# Patient Record
Sex: FEMALE | Race: WHITE | NOT HISPANIC OR LATINO
[De-identification: names, ages, dates, MRNs, and addresses within clinical notes are randomized per-mention and may not be internally consistent; named-entity substitution may affect disease eponyms.]

---

## 2018-01-21 ENCOUNTER — TRANSCRIPTION ENCOUNTER (OUTPATIENT)
Age: 28
End: 2018-01-21

## 2019-07-29 PROBLEM — Z00.00 ENCOUNTER FOR PREVENTIVE HEALTH EXAMINATION: Status: ACTIVE | Noted: 2019-07-29

## 2019-08-05 ENCOUNTER — APPOINTMENT (OUTPATIENT)
Dept: OBGYN | Facility: CLINIC | Age: 29
End: 2019-08-05
Payer: COMMERCIAL

## 2019-08-05 ENCOUNTER — RESULT CHARGE (OUTPATIENT)
Age: 29
End: 2019-08-05

## 2019-08-05 VITALS
BODY MASS INDEX: 34.38 KG/M2 | HEIGHT: 63 IN | DIASTOLIC BLOOD PRESSURE: 80 MMHG | SYSTOLIC BLOOD PRESSURE: 120 MMHG | WEIGHT: 194 LBS

## 2019-08-05 DIAGNOSIS — Z01.419 ENCOUNTER FOR GYNECOLOGICAL EXAMINATION (GENERAL) (ROUTINE) W/OUT ABNORMAL FINDINGS: ICD-10-CM

## 2019-08-05 LAB
BILIRUB UR QL STRIP: NORMAL
GLUCOSE UR-MCNC: NORMAL
HCG UR QL: 0.2 EU/DL
HCG UR QL: POSITIVE
HGB UR QL STRIP.AUTO: NORMAL
KETONES UR-MCNC: NORMAL
LEUKOCYTE ESTERASE UR QL STRIP: NORMAL
NITRITE UR QL STRIP: NORMAL
PH UR STRIP: 7
PROT UR STRIP-MCNC: NORMAL
QUALITY CONTROL: YES
SP GR UR STRIP: 1.01

## 2019-08-05 PROCEDURE — 81025 URINE PREGNANCY TEST: CPT

## 2019-08-05 PROCEDURE — 81003 URINALYSIS AUTO W/O SCOPE: CPT | Mod: QW

## 2019-08-05 PROCEDURE — 99385 PREV VISIT NEW AGE 18-39: CPT

## 2019-08-05 NOTE — DISCUSSION/SUMMARY
[FreeTextEntry1] : 1. pt will schedule new ob within the next 1-2 weeks along with gestational dating sonogram.\par 2. we discused her family history of BRCA. Pt has thought about testing but decided not to test now/wants to complete child bearing prior to testing.  we discussed screening guidelines in the event she was a carrier of a BRCA mutation- including beginning screening at age 25.  She was instructed to perform SBE throughout pregnancy and consider a baseline mammogram after pregnancy as we do not know her carrier status.\par 3. she did well with her first exam.  \par

## 2019-08-05 NOTE — CHIEF COMPLAINT
[Initial Visit] : initial GYN visit [FreeTextEntry1] : NEW PATIENT/ Confirmation of pregnancy  {cs }

## 2019-08-05 NOTE — PHYSICAL EXAM
[Awake] : awake [Alert] : alert [Mass] : no breast mass [Nipple Discharge] : no nipple discharge [Axillary LAD] : no axillary lymphadenopathy [Soft] : soft [Tender] : non tender [Distended] : not distended [Oriented x3] : oriented to person, place, and time [Normal] : cervix [Motion Tenderness] : there was no cervical motion tenderness [Tenderness] : nontender [Enlarged ___ wks] : enlarged [unfilled] ~Uweeks [Mass ___ cm] : no uterine mass was palpated [Uterine Adnexae] : were not tender and not enlarged

## 2019-08-05 NOTE — HISTORY OF PRESENT ILLNESS
[Regular Exercise] : regular exercise [Healthy Diet] : a healthy diet [Good] : being in good health [Reproductive Age] : is of reproductive age [No Previous Pap Smear] : no previous Papanicolaou cytology [HPV Vaccine NA Due to Age] : HPV vaccine not available to patient due to age [Definite:  ___ (Date)] : the last menstrual period was [unfilled] [Sexually Active] : is sexually active [Menarche Age: ____] : age at menarche was [unfilled] [Monogamous] : is monogamous [No] : no [Male ___] : [unfilled] male [Regular Cycle Intervals] : periods have been regular [Weight Concerns] : no concerns with her weight [Contraception] : does not use contraception

## 2019-08-08 ENCOUNTER — ASOB RESULT (OUTPATIENT)
Age: 29
End: 2019-08-08

## 2019-08-08 ENCOUNTER — APPOINTMENT (OUTPATIENT)
Dept: OBGYN | Facility: CLINIC | Age: 29
End: 2019-08-08
Payer: COMMERCIAL

## 2019-08-08 VITALS
DIASTOLIC BLOOD PRESSURE: 70 MMHG | WEIGHT: 193 LBS | BODY MASS INDEX: 34.2 KG/M2 | HEIGHT: 63 IN | SYSTOLIC BLOOD PRESSURE: 120 MMHG

## 2019-08-08 PROCEDURE — 76817 TRANSVAGINAL US OBSTETRIC: CPT

## 2019-08-09 LAB
ABO + RH PNL BLD: NORMAL
BASOPHILS # BLD AUTO: 0.06 K/UL
BASOPHILS NFR BLD AUTO: 0.5 %
BILIRUB UR QL STRIP: NORMAL
BLD GP AB SCN SERPL QL: NORMAL
C TRACH RRNA SPEC QL NAA+PROBE: NOT DETECTED
CYTOLOGY CVX/VAG DOC THIN PREP: ABNORMAL
EOSINOPHIL # BLD AUTO: 0.46 K/UL
EOSINOPHIL NFR BLD AUTO: 4 %
ESTIMATED AVERAGE GLUCOSE: 108 MG/DL
GLUCOSE UR-MCNC: NORMAL
HBA1C MFR BLD HPLC: 5.4 %
HBV SURFACE AG SER QL: NONREACTIVE
HCG UR QL: 0.2 EU/DL
HCT VFR BLD CALC: 40.6 %
HGB BLD-MCNC: 13.1 G/DL
HGB UR QL STRIP.AUTO: NORMAL
HIV1+2 AB SPEC QL IA.RAPID: NONREACTIVE
IMM GRANULOCYTES NFR BLD AUTO: 0.3 %
KETONES UR-MCNC: NORMAL
LEUKOCYTE ESTERASE UR QL STRIP: ABNORMAL
LYMPHOCYTES # BLD AUTO: 2.16 K/UL
LYMPHOCYTES NFR BLD AUTO: 18.6 %
MAN DIFF?: NORMAL
MCHC RBC-ENTMCNC: 28.8 PG
MCHC RBC-ENTMCNC: 32.3 GM/DL
MCV RBC AUTO: 89.2 FL
MONOCYTES # BLD AUTO: 0.62 K/UL
MONOCYTES NFR BLD AUTO: 5.3 %
N GONORRHOEA RRNA SPEC QL NAA+PROBE: NOT DETECTED
NEUTROPHILS # BLD AUTO: 8.26 K/UL
NEUTROPHILS NFR BLD AUTO: 71.3 %
NITRITE UR QL STRIP: NORMAL
PH UR STRIP: 6.5
PLATELET # BLD AUTO: 290 K/UL
PROT UR STRIP-MCNC: NORMAL
RBC # BLD: 4.55 M/UL
RBC # FLD: 13.3 %
SOURCE AMPLIFICATION: NORMAL
SOURCE TP AMPLIFICATION: NORMAL
SP GR UR STRIP: 1.01
T VAGINALIS RRNA SPEC QL NAA+PROBE: NOT DETECTED
TSH SERPL-ACNC: 4.06 UIU/ML
WBC # FLD AUTO: 11.59 K/UL

## 2019-08-26 ENCOUNTER — APPOINTMENT (OUTPATIENT)
Dept: OBGYN | Facility: CLINIC | Age: 29
End: 2019-08-26
Payer: COMMERCIAL

## 2019-08-26 ENCOUNTER — ASOB RESULT (OUTPATIENT)
Age: 29
End: 2019-08-26

## 2019-08-26 ENCOUNTER — NON-APPOINTMENT (OUTPATIENT)
Age: 29
End: 2019-08-26

## 2019-08-26 VITALS
DIASTOLIC BLOOD PRESSURE: 70 MMHG | SYSTOLIC BLOOD PRESSURE: 126 MMHG | WEIGHT: 195 LBS | BODY MASS INDEX: 34.55 KG/M2 | HEIGHT: 63 IN

## 2019-08-26 LAB
AR GENE MUT ANL BLD/T: NEGATIVE
B19V IGG SER QL IA: 6.9 INDEX
B19V IGG+IGM SER-IMP: NORMAL
B19V IGG+IGM SER-IMP: POSITIVE
B19V IGM FLD-ACNC: 0.2
B19V IGM SER-ACNC: NEGATIVE
BILIRUB UR QL STRIP: NORMAL
CFTR MUT TESTED BLD/T: NEGATIVE
CLARITY UR: CLEAR
CMV IGG SERPL QL: <0.2 U/ML
CMV IGG SERPL-IMP: NEGATIVE
CMV IGM SERPL QL: <8 AU/ML
CMV IGM SERPL QL: NEGATIVE
COLLECTION METHOD: NORMAL
FMR1 GENE MUT ANL BLD/T: NORMAL
GLUCOSE UR-MCNC: NORMAL
HCG UR QL: 0.2 EU/DL
HGB A MFR BLD: 97.3 %
HGB A2 MFR BLD: 2.7 %
HGB FRACT BLD-IMP: NORMAL
HGB UR QL STRIP.AUTO: NORMAL
KETONES UR-MCNC: NORMAL
LEUKOCYTE ESTERASE UR QL STRIP: NORMAL
MEV IGG FLD QL IA: 115 AU/ML
MEV IGG+IGM SER-IMP: POSITIVE
MEV IGM SER QL: NEGATIVE
NITRITE UR QL STRIP: NORMAL
PH UR STRIP: 6.5
PROT UR STRIP-MCNC: NORMAL
RUBV IGG FLD-ACNC: 1.5 INDEX
RUBV IGG SER-IMP: POSITIVE
SP GR UR STRIP: 1
T GONDII AB SER-IMP: NEGATIVE
T GONDII AB SER-IMP: NEGATIVE
T GONDII IGG SER QL: <3 IU/ML
T GONDII IGM SER QL: <3 AU/ML
T PALLIDUM AB SER QL IA: NEGATIVE
VZV AB TITR SER: POSITIVE
VZV IGG SER IF-ACNC: 1725 INDEX

## 2019-08-26 PROCEDURE — 76813 OB US NUCHAL MEAS 1 GEST: CPT

## 2019-08-26 PROCEDURE — 0502F SUBSEQUENT PRENATAL CARE: CPT

## 2019-08-26 PROCEDURE — 36415 COLL VENOUS BLD VENIPUNCTURE: CPT

## 2019-09-03 LAB
1ST TRIMESTER DATA: NORMAL
ADDENDUM DOC: NORMAL
AFP PNL SERPL: NORMAL
AFP SERPL-ACNC: NORMAL
CLINICAL BIOCHEMIST REVIEW: NORMAL
FREE BETA HCG 1ST TRIMESTER: NORMAL
Lab: NORMAL
NASAL BONE: PRESENT
NOTES NTD: NORMAL
NT: NORMAL
PAPP-A SERPL-ACNC: NORMAL
TRISOMY 18/3: NORMAL

## 2019-09-05 ENCOUNTER — TRANSCRIPTION ENCOUNTER (OUTPATIENT)
Age: 29
End: 2019-09-05

## 2019-09-24 ENCOUNTER — NON-APPOINTMENT (OUTPATIENT)
Age: 29
End: 2019-09-24

## 2019-09-24 ENCOUNTER — APPOINTMENT (OUTPATIENT)
Dept: OBGYN | Facility: CLINIC | Age: 29
End: 2019-09-24
Payer: COMMERCIAL

## 2019-09-24 VITALS
DIASTOLIC BLOOD PRESSURE: 58 MMHG | SYSTOLIC BLOOD PRESSURE: 118 MMHG | HEIGHT: 63 IN | WEIGHT: 199 LBS | BODY MASS INDEX: 35.26 KG/M2

## 2019-09-24 LAB
BILIRUB UR QL STRIP: NORMAL
GLUCOSE UR-MCNC: NORMAL
HCG UR QL: 0.2 EU/DL
HGB UR QL STRIP.AUTO: NORMAL
KETONES UR-MCNC: ABNORMAL
LEUKOCYTE ESTERASE UR QL STRIP: NORMAL
NITRITE UR QL STRIP: NORMAL
PH UR STRIP: 7
PROT UR STRIP-MCNC: NORMAL
SP GR UR STRIP: 1.02

## 2019-09-24 PROCEDURE — 0502F SUBSEQUENT PRENATAL CARE: CPT

## 2019-09-24 PROCEDURE — 36415 COLL VENOUS BLD VENIPUNCTURE: CPT

## 2019-10-01 ENCOUNTER — NON-APPOINTMENT (OUTPATIENT)
Age: 29
End: 2019-10-01

## 2019-10-16 ENCOUNTER — APPOINTMENT (OUTPATIENT)
Dept: ANTEPARTUM | Facility: CLINIC | Age: 29
End: 2019-10-16
Payer: COMMERCIAL

## 2019-10-16 ENCOUNTER — ASOB RESULT (OUTPATIENT)
Age: 29
End: 2019-10-16

## 2019-10-16 PROCEDURE — 76811 OB US DETAILED SNGL FETUS: CPT

## 2019-10-23 ENCOUNTER — APPOINTMENT (OUTPATIENT)
Dept: OBGYN | Facility: CLINIC | Age: 29
End: 2019-10-23
Payer: COMMERCIAL

## 2019-10-23 ENCOUNTER — NON-APPOINTMENT (OUTPATIENT)
Age: 29
End: 2019-10-23

## 2019-10-23 VITALS
BODY MASS INDEX: 37.03 KG/M2 | WEIGHT: 209 LBS | SYSTOLIC BLOOD PRESSURE: 120 MMHG | HEIGHT: 63 IN | DIASTOLIC BLOOD PRESSURE: 76 MMHG

## 2019-10-23 LAB
BILIRUB UR QL STRIP: NORMAL
GLUCOSE UR-MCNC: NORMAL
HCG UR QL: 0.2 EU/DL
HGB UR QL STRIP.AUTO: NORMAL
KETONES UR-MCNC: NORMAL
LEUKOCYTE ESTERASE UR QL STRIP: NORMAL
NITRITE UR QL STRIP: NORMAL
PH UR STRIP: 7
PROT UR STRIP-MCNC: NORMAL
SP GR UR STRIP: 1.01

## 2019-10-23 PROCEDURE — 0502F SUBSEQUENT PRENATAL CARE: CPT

## 2019-10-26 LAB
1ST TRIMESTER DATA: NORMAL
2ND TRIMESTER DATA: NORMAL
ADDENDUM DOC: NORMAL
AFP PNL SERPL: NORMAL
AFP SERPL-ACNC: NORMAL
AFP SERPL-ACNC: NORMAL
B-HCG FREE SERPL-MCNC: NORMAL
CLINICAL BIOCHEMIST REVIEW: NORMAL
FREE BETA HCG 1ST TRIMESTER: NORMAL
INHIBIN A SERPL-MCNC: NORMAL
NASAL BONE: PRESENT
NOTES NTD: NORMAL
NT: NORMAL
PAPP-A SERPL-ACNC: NORMAL
U ESTRIOL SERPL-SCNC: NORMAL

## 2019-10-27 ENCOUNTER — NON-APPOINTMENT (OUTPATIENT)
Age: 29
End: 2019-10-27

## 2019-11-21 ENCOUNTER — APPOINTMENT (OUTPATIENT)
Dept: OBGYN | Facility: CLINIC | Age: 29
End: 2019-11-21
Payer: COMMERCIAL

## 2019-11-21 ENCOUNTER — NON-APPOINTMENT (OUTPATIENT)
Age: 29
End: 2019-11-21

## 2019-11-21 VITALS
DIASTOLIC BLOOD PRESSURE: 64 MMHG | HEIGHT: 63 IN | BODY MASS INDEX: 38.45 KG/M2 | SYSTOLIC BLOOD PRESSURE: 110 MMHG | WEIGHT: 217 LBS

## 2019-11-21 DIAGNOSIS — Z3A.09 9 WEEKS GESTATION OF PREGNANCY: ICD-10-CM

## 2019-11-21 DIAGNOSIS — Z34.91 ENCOUNTER FOR SUPERVISION OF NORMAL PREGNANCY, UNSPECIFIED, FIRST TRIMESTER: ICD-10-CM

## 2019-11-21 DIAGNOSIS — Z32.01 ENCOUNTER FOR PREGNANCY TEST, RESULT POSITIVE: ICD-10-CM

## 2019-11-21 LAB
BILIRUB UR QL STRIP: NORMAL
GLUCOSE UR-MCNC: NORMAL
HCG UR QL: 0.2 EU/DL
HGB UR QL STRIP.AUTO: NORMAL
KETONES UR-MCNC: NORMAL
LEUKOCYTE ESTERASE UR QL STRIP: NORMAL
NITRITE UR QL STRIP: NORMAL
PH UR STRIP: 6
PROT UR STRIP-MCNC: NORMAL
SP GR UR STRIP: 1.02

## 2019-11-21 PROCEDURE — 90471 IMMUNIZATION ADMIN: CPT

## 2019-11-21 PROCEDURE — 90686 IIV4 VACC NO PRSV 0.5 ML IM: CPT

## 2019-11-21 PROCEDURE — 0502F SUBSEQUENT PRENATAL CARE: CPT

## 2019-12-13 ENCOUNTER — APPOINTMENT (OUTPATIENT)
Dept: OBGYN | Facility: CLINIC | Age: 29
End: 2019-12-13
Payer: COMMERCIAL

## 2019-12-13 VITALS
HEIGHT: 63 IN | DIASTOLIC BLOOD PRESSURE: 58 MMHG | BODY MASS INDEX: 38.98 KG/M2 | WEIGHT: 220 LBS | SYSTOLIC BLOOD PRESSURE: 112 MMHG

## 2019-12-13 LAB
BILIRUB UR QL STRIP: NORMAL
GLUCOSE UR-MCNC: NORMAL
HCG UR QL: 0.2 EU/DL
HGB UR QL STRIP.AUTO: NORMAL
KETONES UR-MCNC: NORMAL
LEUKOCYTE ESTERASE UR QL STRIP: ABNORMAL
NITRITE UR QL STRIP: NORMAL
PH UR STRIP: 7
PROT UR STRIP-MCNC: NORMAL
SP GR UR STRIP: 1.01

## 2019-12-13 PROCEDURE — 0502F SUBSEQUENT PRENATAL CARE: CPT

## 2019-12-27 ENCOUNTER — APPOINTMENT (OUTPATIENT)
Dept: OBGYN | Facility: CLINIC | Age: 29
End: 2019-12-27
Payer: COMMERCIAL

## 2019-12-27 ENCOUNTER — NON-APPOINTMENT (OUTPATIENT)
Age: 29
End: 2019-12-27

## 2019-12-27 VITALS
WEIGHT: 227 LBS | DIASTOLIC BLOOD PRESSURE: 70 MMHG | BODY MASS INDEX: 40.22 KG/M2 | SYSTOLIC BLOOD PRESSURE: 110 MMHG | HEIGHT: 63 IN

## 2019-12-27 LAB
BILIRUB UR QL STRIP: NORMAL
GLUCOSE UR-MCNC: NORMAL
HCG UR QL: 0.2 EU/DL
HGB UR QL STRIP.AUTO: NORMAL
KETONES UR-MCNC: NORMAL
LEUKOCYTE ESTERASE UR QL STRIP: ABNORMAL
NITRITE UR QL STRIP: NORMAL
PH UR STRIP: 7
PROT UR STRIP-MCNC: NORMAL
SP GR UR STRIP: 1.02

## 2019-12-27 PROCEDURE — 0502F SUBSEQUENT PRENATAL CARE: CPT

## 2019-12-27 PROCEDURE — 59425 ANTEPARTUM CARE ONLY: CPT

## 2019-12-28 LAB
BASOPHILS # BLD AUTO: 0.06 K/UL
BASOPHILS NFR BLD AUTO: 0.5 %
EOSINOPHIL # BLD AUTO: 0.49 K/UL
EOSINOPHIL NFR BLD AUTO: 4.2 %
GLUCOSE 1H P 50 G GLC PO SERPL-MCNC: 129 MG/DL
HCT VFR BLD CALC: 37.2 %
HGB BLD-MCNC: 11.8 G/DL
IMM GRANULOCYTES NFR BLD AUTO: 0.5 %
LYMPHOCYTES # BLD AUTO: 1.69 K/UL
LYMPHOCYTES NFR BLD AUTO: 14.6 %
MAN DIFF?: NORMAL
MCHC RBC-ENTMCNC: 29.5 PG
MCHC RBC-ENTMCNC: 31.7 GM/DL
MCV RBC AUTO: 93 FL
MONOCYTES # BLD AUTO: 0.58 K/UL
MONOCYTES NFR BLD AUTO: 5 %
NEUTROPHILS # BLD AUTO: 8.68 K/UL
NEUTROPHILS NFR BLD AUTO: 75.2 %
PLATELET # BLD AUTO: 263 K/UL
RBC # BLD: 4 M/UL
RBC # FLD: 13.6 %
WBC # FLD AUTO: 11.56 K/UL

## 2020-01-02 ENCOUNTER — APPOINTMENT (OUTPATIENT)
Dept: ANTEPARTUM | Facility: CLINIC | Age: 30
End: 2020-01-02
Payer: MEDICARE

## 2020-01-02 ENCOUNTER — ASOB RESULT (OUTPATIENT)
Age: 30
End: 2020-01-02

## 2020-01-02 PROCEDURE — 76816 OB US FOLLOW-UP PER FETUS: CPT

## 2020-01-02 PROCEDURE — 76820 UMBILICAL ARTERY ECHO: CPT

## 2020-01-02 PROCEDURE — 76821 MIDDLE CEREBRAL ARTERY ECHO: CPT

## 2020-01-10 ENCOUNTER — APPOINTMENT (OUTPATIENT)
Dept: ANTEPARTUM | Facility: CLINIC | Age: 30
End: 2020-01-10
Payer: MEDICARE

## 2020-01-10 ENCOUNTER — APPOINTMENT (OUTPATIENT)
Dept: OBGYN | Facility: CLINIC | Age: 30
End: 2020-01-10
Payer: COMMERCIAL

## 2020-01-10 ENCOUNTER — ASOB RESULT (OUTPATIENT)
Age: 30
End: 2020-01-10

## 2020-01-10 VITALS
SYSTOLIC BLOOD PRESSURE: 122 MMHG | DIASTOLIC BLOOD PRESSURE: 74 MMHG | HEIGHT: 63 IN | WEIGHT: 228 LBS | BODY MASS INDEX: 40.4 KG/M2

## 2020-01-10 PROCEDURE — 76820 UMBILICAL ARTERY ECHO: CPT

## 2020-01-10 PROCEDURE — 90471 IMMUNIZATION ADMIN: CPT

## 2020-01-10 PROCEDURE — 76818 FETAL BIOPHYS PROFILE W/NST: CPT

## 2020-01-10 PROCEDURE — 90715 TDAP VACCINE 7 YRS/> IM: CPT

## 2020-01-10 PROCEDURE — 0502F SUBSEQUENT PRENATAL CARE: CPT

## 2020-01-14 ENCOUNTER — NON-APPOINTMENT (OUTPATIENT)
Age: 30
End: 2020-01-14

## 2020-01-14 ENCOUNTER — APPOINTMENT (OUTPATIENT)
Dept: CARDIOLOGY | Facility: CLINIC | Age: 30
End: 2020-01-14
Payer: COMMERCIAL

## 2020-01-14 VITALS
WEIGHT: 230 LBS | HEART RATE: 86 BPM | BODY MASS INDEX: 40.75 KG/M2 | HEIGHT: 63 IN | DIASTOLIC BLOOD PRESSURE: 77 MMHG | RESPIRATION RATE: 15 BRPM | SYSTOLIC BLOOD PRESSURE: 123 MMHG

## 2020-01-14 DIAGNOSIS — Z72.3 LACK OF PHYSICAL EXERCISE: ICD-10-CM

## 2020-01-14 PROCEDURE — 99243 OFF/OP CNSLTJ NEW/EST LOW 30: CPT

## 2020-01-14 PROCEDURE — 93000 ELECTROCARDIOGRAM COMPLETE: CPT

## 2020-01-14 RX ORDER — ALBUTEROL SULFATE 2.5 MG/3ML
(2.5 MG/3ML) SOLUTION RESPIRATORY (INHALATION)
Refills: 0 | Status: DISCONTINUED | COMMUNITY
End: 2020-01-14

## 2020-01-14 NOTE — DISCUSSION/SUMMARY
[FreeTextEntry1] : Patient is a 30yo F who is 32 weeks gestation here for cardiac evaluation of palpitations. Symptoms likely physiologic related to standing, pregnancy and reduced venous return leading to increased  HR/sympathetic tone. Low suspicion for dysrhythmia. HAs improved with cutting back caffeine. CArdiac exam and ECG unremarkable.\par \par 1. PAtient to continue to monitor symptoms, if progress/worsen will let me know \par 2. INcrease PO hydration, avoid prolonged standing if possible and keep off caffeine\par 3. Follow up as needed

## 2020-01-14 NOTE — PHYSICAL EXAM
[General Appearance - Well Developed] : well developed [General Appearance - Well Nourished] : well nourished [Normal Conjunctiva] : the conjunctiva exhibited no abnormalities [Normal Jugular Venous V Waves Present] : normal jugular venous V waves present [Normal Oropharynx] : normal oropharynx [Heart Rate And Rhythm] : heart rate and rhythm were normal [Heart Sounds] : normal S1 and S2 [Murmurs] : no murmurs present [] : no respiratory distress [Auscultation Breath Sounds / Voice Sounds] : lungs were clear to auscultation bilaterally [Respiration, Rhythm And Depth] : normal respiratory rhythm and effort [Bowel Sounds] : normal bowel sounds [Abdomen Soft] : soft [Abdomen Tenderness] : non-tender [Abnormal Walk] : normal gait [Nail Clubbing] : no clubbing of the fingernails [Cyanosis, Localized] : no localized cyanosis [Skin Turgor] : normal skin turgor [Skin Color & Pigmentation] : normal skin color and pigmentation [Oriented To Time, Place, And Person] : oriented to person, place, and time [FreeTextEntry1] : no edema

## 2020-01-14 NOTE — HISTORY OF PRESENT ILLNESS
[FreeTextEntry1] : Patient is a 28yo F who is 32 weeks gestation here for cardiac evaluation of palpitations. Works as LPN and on feet a lot. Works in [place] at [employer]. States asthma has gotten worse as gotten older, also during pregnancy. Had flu couple weeks back and now better. Back to baseline now. \par \par Palps started at work, was on her feet a few weeks back. Felt her heart pounding, felt dizzy and weak in her arms. Was in the morning around 8am. Resolved on its own. REcurred couple times throughout the morning. Went to ED at [place], ECG unremarkable and BW as well. Palps lasted a bout 1-2 minutes. Has recurred a couple times. Stopped caffeine, which seems to have helped some. When up on feet symptoms worse. No syncope.No CP/SOB/edema. FElt nausea as well with palps at times. HAs not occurred daily but at times since initial episode.  \par \par ROS: GI negative, all others negative

## 2020-01-17 ENCOUNTER — APPOINTMENT (OUTPATIENT)
Dept: ANTEPARTUM | Facility: CLINIC | Age: 30
End: 2020-01-17
Payer: MEDICARE

## 2020-01-17 ENCOUNTER — ASOB RESULT (OUTPATIENT)
Age: 30
End: 2020-01-17

## 2020-01-17 PROCEDURE — 76818 FETAL BIOPHYS PROFILE W/NST: CPT

## 2020-01-17 PROCEDURE — 76820 UMBILICAL ARTERY ECHO: CPT

## 2020-01-24 ENCOUNTER — ASOB RESULT (OUTPATIENT)
Age: 30
End: 2020-01-24

## 2020-01-24 ENCOUNTER — APPOINTMENT (OUTPATIENT)
Dept: ANTEPARTUM | Facility: CLINIC | Age: 30
End: 2020-01-24
Payer: MEDICARE

## 2020-01-24 ENCOUNTER — APPOINTMENT (OUTPATIENT)
Dept: OBGYN | Facility: CLINIC | Age: 30
End: 2020-01-24
Payer: COMMERCIAL

## 2020-01-24 ENCOUNTER — NON-APPOINTMENT (OUTPATIENT)
Age: 30
End: 2020-01-24

## 2020-01-24 VITALS
SYSTOLIC BLOOD PRESSURE: 120 MMHG | BODY MASS INDEX: 40.93 KG/M2 | DIASTOLIC BLOOD PRESSURE: 72 MMHG | WEIGHT: 231 LBS | HEIGHT: 63 IN

## 2020-01-24 LAB
BILIRUB UR QL STRIP: NORMAL
CLARITY UR: CLEAR
COLLECTION METHOD: NORMAL
GLUCOSE UR-MCNC: NORMAL
HCG UR QL: 0.2 EU/DL
HGB UR QL STRIP.AUTO: NORMAL
KETONES UR-MCNC: NORMAL
LEUKOCYTE ESTERASE UR QL STRIP: NORMAL
NITRITE UR QL STRIP: NORMAL
PH UR STRIP: 7.5
PROT UR STRIP-MCNC: NORMAL
SP GR UR STRIP: 1.02

## 2020-01-24 PROCEDURE — 93976 VASCULAR STUDY: CPT

## 2020-01-24 PROCEDURE — 0502F SUBSEQUENT PRENATAL CARE: CPT

## 2020-01-24 PROCEDURE — 76820 UMBILICAL ARTERY ECHO: CPT

## 2020-01-24 PROCEDURE — 76821 MIDDLE CEREBRAL ARTERY ECHO: CPT

## 2020-01-24 PROCEDURE — 76818 FETAL BIOPHYS PROFILE W/NST: CPT

## 2020-01-31 ENCOUNTER — APPOINTMENT (OUTPATIENT)
Dept: ANTEPARTUM | Facility: CLINIC | Age: 30
End: 2020-01-31
Payer: MEDICARE

## 2020-01-31 ENCOUNTER — ASOB RESULT (OUTPATIENT)
Age: 30
End: 2020-01-31

## 2020-01-31 PROCEDURE — 76821 MIDDLE CEREBRAL ARTERY ECHO: CPT

## 2020-01-31 PROCEDURE — 93976 VASCULAR STUDY: CPT

## 2020-01-31 PROCEDURE — 76818 FETAL BIOPHYS PROFILE W/NST: CPT

## 2020-01-31 PROCEDURE — 76816 OB US FOLLOW-UP PER FETUS: CPT

## 2020-01-31 PROCEDURE — 76820 UMBILICAL ARTERY ECHO: CPT

## 2020-02-07 ENCOUNTER — ASOB RESULT (OUTPATIENT)
Age: 30
End: 2020-02-07

## 2020-02-07 ENCOUNTER — APPOINTMENT (OUTPATIENT)
Dept: OBGYN | Facility: CLINIC | Age: 30
End: 2020-02-07
Payer: COMMERCIAL

## 2020-02-07 ENCOUNTER — APPOINTMENT (OUTPATIENT)
Dept: ANTEPARTUM | Facility: CLINIC | Age: 30
End: 2020-02-07
Payer: COMMERCIAL

## 2020-02-07 VITALS
WEIGHT: 247 LBS | HEIGHT: 63 IN | SYSTOLIC BLOOD PRESSURE: 118 MMHG | DIASTOLIC BLOOD PRESSURE: 64 MMHG | BODY MASS INDEX: 43.77 KG/M2

## 2020-02-07 PROCEDURE — 76818 FETAL BIOPHYS PROFILE W/NST: CPT

## 2020-02-07 PROCEDURE — 76820 UMBILICAL ARTERY ECHO: CPT

## 2020-02-07 PROCEDURE — 76821 MIDDLE CEREBRAL ARTERY ECHO: CPT

## 2020-02-07 PROCEDURE — 36415 COLL VENOUS BLD VENIPUNCTURE: CPT

## 2020-02-07 PROCEDURE — 93976 VASCULAR STUDY: CPT

## 2020-02-07 PROCEDURE — 0502F SUBSEQUENT PRENATAL CARE: CPT

## 2020-02-08 ENCOUNTER — NON-APPOINTMENT (OUTPATIENT)
Age: 30
End: 2020-02-08

## 2020-02-09 LAB
BILIRUB UR QL STRIP: NORMAL
GLUCOSE UR-MCNC: NORMAL
HCG UR QL: 0.2 EU/DL
HGB UR QL STRIP.AUTO: NORMAL
HIV1+2 AB SPEC QL IA.RAPID: NONREACTIVE
KETONES UR-MCNC: NORMAL
LEUKOCYTE ESTERASE UR QL STRIP: NORMAL
NITRITE UR QL STRIP: NORMAL
PH UR STRIP: 5.5
PROT UR STRIP-MCNC: NORMAL
SP GR UR STRIP: 1

## 2020-02-14 ENCOUNTER — APPOINTMENT (OUTPATIENT)
Dept: ANTEPARTUM | Facility: CLINIC | Age: 30
End: 2020-02-14
Payer: COMMERCIAL

## 2020-02-14 ENCOUNTER — ASOB RESULT (OUTPATIENT)
Age: 30
End: 2020-02-14

## 2020-02-14 ENCOUNTER — APPOINTMENT (OUTPATIENT)
Dept: OBGYN | Facility: CLINIC | Age: 30
End: 2020-02-14
Payer: COMMERCIAL

## 2020-02-14 VITALS
WEIGHT: 237 LBS | HEIGHT: 63 IN | DIASTOLIC BLOOD PRESSURE: 64 MMHG | BODY MASS INDEX: 41.99 KG/M2 | SYSTOLIC BLOOD PRESSURE: 120 MMHG

## 2020-02-14 PROCEDURE — 93976 VASCULAR STUDY: CPT

## 2020-02-14 PROCEDURE — 0502F SUBSEQUENT PRENATAL CARE: CPT

## 2020-02-14 PROCEDURE — 76818 FETAL BIOPHYS PROFILE W/NST: CPT

## 2020-02-14 PROCEDURE — 76820 UMBILICAL ARTERY ECHO: CPT

## 2020-02-14 PROCEDURE — 76821 MIDDLE CEREBRAL ARTERY ECHO: CPT

## 2020-02-21 ENCOUNTER — APPOINTMENT (OUTPATIENT)
Dept: ANTEPARTUM | Facility: CLINIC | Age: 30
End: 2020-02-21
Payer: COMMERCIAL

## 2020-02-21 ENCOUNTER — NON-APPOINTMENT (OUTPATIENT)
Age: 30
End: 2020-02-21

## 2020-02-21 ENCOUNTER — APPOINTMENT (OUTPATIENT)
Dept: OBGYN | Facility: CLINIC | Age: 30
End: 2020-02-21
Payer: COMMERCIAL

## 2020-02-21 ENCOUNTER — ASOB RESULT (OUTPATIENT)
Age: 30
End: 2020-02-21

## 2020-02-21 VITALS
BODY MASS INDEX: 43.41 KG/M2 | WEIGHT: 245 LBS | SYSTOLIC BLOOD PRESSURE: 112 MMHG | DIASTOLIC BLOOD PRESSURE: 68 MMHG | HEIGHT: 63 IN

## 2020-02-21 LAB
B-HEM STREP SPEC QL CULT: ABNORMAL
BILIRUB UR QL STRIP: NORMAL
GLUCOSE UR-MCNC: NORMAL
HCG UR QL: 0.2 EU/DL
HGB UR QL STRIP.AUTO: NORMAL
KETONES UR-MCNC: NORMAL
LEUKOCYTE ESTERASE UR QL STRIP: ABNORMAL
NITRITE UR QL STRIP: NORMAL
PH UR STRIP: 7
PROT UR STRIP-MCNC: NORMAL
SP GR UR STRIP: 1.01

## 2020-02-21 PROCEDURE — 76820 UMBILICAL ARTERY ECHO: CPT

## 2020-02-21 PROCEDURE — 76821 MIDDLE CEREBRAL ARTERY ECHO: CPT

## 2020-02-21 PROCEDURE — 93976 VASCULAR STUDY: CPT

## 2020-02-21 PROCEDURE — 76818 FETAL BIOPHYS PROFILE W/NST: CPT

## 2020-02-21 PROCEDURE — 59425 ANTEPARTUM CARE ONLY: CPT

## 2020-02-21 PROCEDURE — 0502F SUBSEQUENT PRENATAL CARE: CPT

## 2020-02-21 PROCEDURE — 76816 OB US FOLLOW-UP PER FETUS: CPT

## 2020-02-23 RX ORDER — SODIUM CHLORIDE 9 MG/ML
1000 INJECTION, SOLUTION INTRAVENOUS
Refills: 0 | Status: DISCONTINUED | OUTPATIENT
Start: 2020-02-24 | End: 2020-02-26

## 2020-02-23 RX ORDER — AMPICILLIN TRIHYDRATE 250 MG
1 CAPSULE ORAL EVERY 4 HOURS
Refills: 0 | Status: DISCONTINUED | OUTPATIENT
Start: 2020-02-24 | End: 2020-02-26

## 2020-02-23 RX ORDER — OXYTOCIN 10 UNIT/ML
333.33 VIAL (ML) INJECTION
Qty: 20 | Refills: 0 | Status: DISCONTINUED | OUTPATIENT
Start: 2020-02-24 | End: 2020-02-26

## 2020-02-23 RX ORDER — CITRIC ACID/SODIUM CITRATE 300-500 MG
30 SOLUTION, ORAL ORAL ONCE
Refills: 0 | Status: COMPLETED | OUTPATIENT
Start: 2020-02-24 | End: 2020-02-26

## 2020-02-23 NOTE — OB PROVIDER H&P - HISTORY OF PRESENT ILLNESS
Patient is a 30yo  at 38 3/7 weeks gestation c/w LMP who presents to L&D for IOL for polyhydramnios SABRINA: 25.84  RD: 3/6/20  LMP: 19  Prenatal course complicated by:  1.	Polyhydramnios, SABRINA 25.84  2.	Excessive fetal growth  3.	Obesity, BMI 34.18  OBHx: denies  PMH: asthma  PSH: denies  Meds: PNV, albuterol  ALL: iodine, shellfish  Sono: vertex, placenta anterior              EFW: 4000                BMI:  34.18    GBS: POS  HIV: NR  RPR: Neg  Rubella: Immune  Rubeola: Immune  HepB: NR  ABO: O+ Patient is a 30yo  at 38 3/7 weeks gestation c/w LMP who presents to L&D for IOL for polyhydramnios SABRINA: 25.84  RD: 3/6/20  LMP: 19  Prenatal course complicated by:  1.	Polyhydramnios, SABRINA 25.84  2.	Excessive fetal growth  3.	Obesity, BMI 34.18  OBHx: denies  PMH: asthma  PSH: denies  Meds: PNV, albuterol, asthmanex  ALL: iodine, shellfish  Sono: vertex, placenta anterior              EFW: 4000                BMI:  34.18    GBS: POS  HIV: NR  RPR: Neg  Rubella: Immune  Rubeola: Immune  HepB: NR  ABO: O+

## 2020-02-23 NOTE — OB PROVIDER H&P - NSHPPHYSICALEXAM_GEN_ALL_CORE
What Are Neuromas of the Foot?  The ball of your foot is the bottom part just behind your toes. Bands of tissue (ligaments) connect the bones in the ball of your foot. Nerves run between the bones and underneath the ligaments. When a nerve becomes pinched, this causes it to swell and become painful due to the thickening of the tissue that surrounds the nerve. The painful, swollen nerve is called a neuroma (also called Jose's neuroma).     A neuroma most often occurs at the base of either the third and fourth toes or the second and third toes.   What causes a neuroma?  Wearing tight or high-heeled shoes can cause a neuroma. Shoes that are too narrow or too pointed squeeze the bones in the ball of the foot. Shoes with high heels put extra pressure on the ends of the bones. When the bones are squeezed together, they pinch the nerve that runs between them.  Symptoms  The most common symptom of a neuroma is pain in the ball of the foot between two toes. The pain may be dull or sharp. It may feel as if you have a stone in your shoe. You may also have tingling or numbness in one or both of the toes. Symptoms may occur after you have been walking or standing for a while. Taking off your shoes and rubbing the ball of your foot may decrease or relieve the pain.  Preventing future problems  To prevent a future neuroma, buy shoes with plenty of room across the ball of the foot and in the toes. This keeps the bones from being squeezed together. Wearing low-heeled shoes (less than 2 inches) also puts less pressure on the bones and nerves in the ball of the foot.   © 4484-3291 [employer] [address] [place] [address], [place] [address]. All rights reserved. This information is not intended as a substitute for professional medical care. Always follow your healthcare professional's instructions.         Vital Signs Last 24 Hrs  T(C): 36.7 (24 Feb 2020 21:22), Max: 36.7 (24 Feb 2020 21:06)  T(F): 98.06 (24 Feb 2020 21:22), Max: 98.1 (24 Feb 2020 21:06)  HR: 109 (24 Feb 2020 21:22) (109 - 109)  BP: 143/89 (24 Feb 2020 21:22) (143/89 - 143/89)  RR: 16 (24 Feb 2020 21:22) (16 - 16)    General: NAD  Abdomen: soft, NT, gravid uterus  SVE: 1/20/-3, cephalic, intact    Bedside sono: cephalic, anterior placenta

## 2020-02-23 NOTE — OB PROVIDER H&P - ASSESSMENT
30yo  at 38 3/7 weeks gestation c/w LMP who presents to L&D for IOL for polyhydramnios    -Admit to L&D  -Admission labs sent  -GBS positive, ampicillin for ppx  -Begin induction 30yo  at 38 3/7 weeks gestation c/w LMP who presents to L&D for IOL for polyhydramnios    -Admit to L&D  -Admission labs sent  -GBS positive, ampicillin for ppx  -Cytotec induction   - IV Fluids

## 2020-02-24 ENCOUNTER — INPATIENT (INPATIENT)
Facility: HOSPITAL | Age: 30
LOS: 5 days | Discharge: HOME | End: 2020-03-01
Attending: SPECIALIST | Admitting: SPECIALIST
Payer: COMMERCIAL

## 2020-02-24 VITALS
TEMPERATURE: 98 F | DIASTOLIC BLOOD PRESSURE: 89 MMHG | SYSTOLIC BLOOD PRESSURE: 143 MMHG | RESPIRATION RATE: 16 BRPM | WEIGHT: 293 LBS | HEIGHT: 63 IN | HEART RATE: 109 BPM

## 2020-02-24 DIAGNOSIS — O47.1 FALSE LABOR AT OR AFTER 37 COMPLETED WEEKS OF GESTATION: ICD-10-CM

## 2020-02-24 DIAGNOSIS — Z3A.38 38 WEEKS GESTATION OF PREGNANCY: ICD-10-CM

## 2020-02-24 DIAGNOSIS — O40.3XX0 POLYHYDRAMNIOS, THIRD TRIMESTER, NOT APPLICABLE OR UNSPECIFIED: ICD-10-CM

## 2020-02-24 LAB
APPEARANCE UR: CLEAR — SIGNIFICANT CHANGE UP
BASOPHILS # BLD AUTO: 0.02 K/UL — SIGNIFICANT CHANGE UP (ref 0–0.2)
BASOPHILS NFR BLD AUTO: 0.2 % — SIGNIFICANT CHANGE UP (ref 0–2)
BILIRUB UR-MCNC: NEGATIVE — SIGNIFICANT CHANGE UP
COLOR SPEC: YELLOW — SIGNIFICANT CHANGE UP
DIFF PNL FLD: NEGATIVE — SIGNIFICANT CHANGE UP
EOSINOPHIL # BLD AUTO: 0.09 K/UL — SIGNIFICANT CHANGE UP (ref 0–0.5)
EOSINOPHIL NFR BLD AUTO: 0.8 % — SIGNIFICANT CHANGE UP (ref 0–6)
EPI CELLS # UR: SIGNIFICANT CHANGE UP
GLUCOSE UR QL: NEGATIVE MG/DL — SIGNIFICANT CHANGE UP
HCT VFR BLD CALC: 35 % — SIGNIFICANT CHANGE UP (ref 34.5–45)
HGB BLD-MCNC: 11.2 G/DL — LOW (ref 11.5–15.5)
IMM GRANULOCYTES NFR BLD AUTO: 0.3 % — SIGNIFICANT CHANGE UP (ref 0–1.5)
KETONES UR-MCNC: NEGATIVE — SIGNIFICANT CHANGE UP
LEUKOCYTE ESTERASE UR-ACNC: ABNORMAL
LYMPHOCYTES # BLD AUTO: 1.97 K/UL — SIGNIFICANT CHANGE UP (ref 1–3.3)
LYMPHOCYTES # BLD AUTO: 16.8 % — SIGNIFICANT CHANGE UP (ref 13–44)
MCHC RBC-ENTMCNC: 28.3 PG — SIGNIFICANT CHANGE UP (ref 27–34)
MCHC RBC-ENTMCNC: 32 GM/DL — SIGNIFICANT CHANGE UP (ref 32–36)
MCV RBC AUTO: 88.4 FL — SIGNIFICANT CHANGE UP (ref 80–100)
MONOCYTES # BLD AUTO: 0.85 K/UL — SIGNIFICANT CHANGE UP (ref 0–0.9)
MONOCYTES NFR BLD AUTO: 7.3 % — SIGNIFICANT CHANGE UP (ref 2–14)
NEUTROPHILS # BLD AUTO: 8.74 K/UL — HIGH (ref 1.8–7.4)
NEUTROPHILS NFR BLD AUTO: 74.6 % — SIGNIFICANT CHANGE UP (ref 43–77)
NITRITE UR-MCNC: NEGATIVE — SIGNIFICANT CHANGE UP
PH UR: 7 — SIGNIFICANT CHANGE UP (ref 5–8)
PLATELET # BLD AUTO: 153 K/UL — SIGNIFICANT CHANGE UP (ref 150–400)
PROT UR-MCNC: 15 MG/DL
RBC # BLD: 3.96 M/UL — SIGNIFICANT CHANGE UP (ref 3.8–5.2)
RBC # FLD: 12.6 % — SIGNIFICANT CHANGE UP (ref 10.3–14.5)
RBC CASTS # UR COMP ASSIST: NEGATIVE /HPF — SIGNIFICANT CHANGE UP (ref 0–4)
SP GR SPEC: 1 — LOW (ref 1.01–1.02)
UROBILINOGEN FLD QL: NEGATIVE MG/DL — SIGNIFICANT CHANGE UP
WBC # BLD: 11.7 K/UL — HIGH (ref 3.8–10.5)
WBC # FLD AUTO: 11.7 K/UL — HIGH (ref 3.8–10.5)
WBC UR QL: SIGNIFICANT CHANGE UP

## 2020-02-24 PROCEDURE — 99222 1ST HOSP IP/OBS MODERATE 55: CPT

## 2020-02-24 RX ORDER — ALBUTEROL 90 UG/1
2 AEROSOL, METERED ORAL EVERY 6 HOURS
Refills: 0 | Status: DISCONTINUED | OUTPATIENT
Start: 2020-02-24 | End: 2020-03-01

## 2020-02-24 RX ORDER — MOMETASONE FUROATE 220 UG/1
2 INHALANT RESPIRATORY (INHALATION) AT BEDTIME
Refills: 0 | Status: DISCONTINUED | OUTPATIENT
Start: 2020-02-24 | End: 2020-03-01

## 2020-02-24 RX ORDER — AMPICILLIN TRIHYDRATE 250 MG
2 CAPSULE ORAL ONCE
Refills: 0 | Status: COMPLETED | OUTPATIENT
Start: 2020-02-24 | End: 2020-02-24

## 2020-02-24 RX ADMIN — SODIUM CHLORIDE 125 MILLILITER(S): 9 INJECTION, SOLUTION INTRAVENOUS at 22:00

## 2020-02-24 RX ADMIN — Medication 216 GRAM(S): at 22:30

## 2020-02-24 RX ADMIN — MOMETASONE FUROATE 2 PUFF(S): 220 INHALANT RESPIRATORY (INHALATION) at 22:31

## 2020-02-25 ENCOUNTER — TRANSCRIPTION ENCOUNTER (OUTPATIENT)
Age: 30
End: 2020-02-25

## 2020-02-25 ENCOUNTER — APPOINTMENT (OUTPATIENT)
Dept: OBGYN | Facility: HOSPITAL | Age: 30
End: 2020-02-25

## 2020-02-25 LAB
BLD GP AB SCN SERPL QL: SIGNIFICANT CHANGE UP
T PALLIDUM AB TITR SER: NEGATIVE — SIGNIFICANT CHANGE UP

## 2020-02-25 RX ORDER — PHENYLEPHRINE-SHARK LIVER OIL-MINERAL OIL-PETROLATUM RECTAL OINTMENT
1 OINTMENT (GRAM) RECTAL
Refills: 0 | Status: DISCONTINUED | OUTPATIENT
Start: 2020-02-25 | End: 2020-03-01

## 2020-02-25 RX ADMIN — Medication 108 GRAM(S): at 02:30

## 2020-02-25 RX ADMIN — Medication 108 GRAM(S): at 10:30

## 2020-02-25 RX ADMIN — SODIUM CHLORIDE 125 MILLILITER(S): 9 INJECTION, SOLUTION INTRAVENOUS at 16:47

## 2020-02-25 RX ADMIN — Medication 108 GRAM(S): at 22:16

## 2020-02-25 RX ADMIN — Medication 108 GRAM(S): at 14:53

## 2020-02-25 RX ADMIN — SODIUM CHLORIDE 125 MILLILITER(S): 9 INJECTION, SOLUTION INTRAVENOUS at 06:00

## 2020-02-25 RX ADMIN — Medication 108 GRAM(S): at 06:30

## 2020-02-25 RX ADMIN — Medication 108 GRAM(S): at 18:53

## 2020-02-25 RX ADMIN — SODIUM CHLORIDE 125 MILLILITER(S): 9 INJECTION, SOLUTION INTRAVENOUS at 22:17

## 2020-02-25 RX ADMIN — PHENYLEPHRINE-SHARK LIVER OIL-MINERAL OIL-PETROLATUM RECTAL OINTMENT 1 APPLICATION(S): at 22:12

## 2020-02-25 RX ADMIN — MOMETASONE FUROATE 2 PUFF(S): 220 INHALANT RESPIRATORY (INHALATION) at 22:01

## 2020-02-25 NOTE — CHART NOTE - NSCHARTNOTEFT_GEN_A_CORE
Patient doing well  admitted for induction of labor per [facility] recommendation     on Cytotec protocol    SVE closed/long /posterior    Plan   Continue cytotec  will attempt to place intracervical zelaya balloon

## 2020-02-25 NOTE — CHART NOTE - NSCHARTNOTEFT_GEN_A_CORE
OB Note      Patient s/p Cytotec protocol   She is feeling uncomfortable     SVE - cook balloon in place     Options reviewed with patient    She requests an epidural   Anesthesia made aware

## 2020-02-25 NOTE — CHART NOTE - NSCHARTNOTEFT_GEN_A_CORE
02-25-20 @ 03:19      ICU Vital Signs Last 24 Hrs  T(C): 36.8 (25 Feb 2020 02:33), Max: 36.9 (25 Feb 2020 00:34)  T(F): 98.24 (25 Feb 2020 02:33), Max: 98.42 (25 Feb 2020 00:34)  HR: 84 (25 Feb 2020 02:33) (84 - 109)  BP: 124/68 (25 Feb 2020 02:33) (117/68 - 143/89)  RR: 14 (25 Feb 2020 02:33) (14 - 16)      FHT: 120 bpm, moderate variability, +accels, and no decels present  - category I tracing  [place]: irregular contractions  SVE: deferred      PLAN:  Cat 1 tracing  Continue with Cytotec induction( s/p 20x3)  Continuous FHT/[place]  Maternal/fetal status reassuring  Will continue to reassess prn.

## 2020-02-25 NOTE — CHART NOTE - NSCHARTNOTEFT_GEN_A_CORE
Patient is undergoing an induction of labor for polyhydramnios.     Vital Signs Last 24 Hrs  T(C): 36.6 (25 Feb 2020 06:31), Max: 36.9 (25 Feb 2020 00:34)  T(F): 97.88 (25 Feb 2020 06:31), Max: 98.42 (25 Feb 2020 00:34)  HR: 78 (25 Feb 2020 06:31) (78 - 109)  BP: 112/61 (25 Feb 2020 06:31) (112/61 - 143/89)  RR: 18 (25 Feb 2020 06:31) (14 - 18)    SVE @2100: 1/20/-3  FHT: baseline 135bpm, moderate variability, +accels, -deccels  [place]: irregular contractions q3 minutes    a/p  Patient is undergoing an induction of labor for polyhydramnios.   - continue cytotec induction

## 2020-02-25 NOTE — OB PROVIDER IHI INDUCTION/AUGMENTATION NOTE - NS_CHECKALL_OBGYN_ALL_OB
Order was written/H&P was completed/FHR was reviewed/Contractions pattern was reviewed/Induction / Augmentation was discussed
H&P was completed/FHR was reviewed/Order was written/Induction / Augmentation was discussed/Contractions pattern was reviewed

## 2020-02-25 NOTE — CHART NOTE - NSCHARTNOTEFT_GEN_A_CORE
S: Patient doing well no complaints at this time. She is barely feeling contractions     O:   Vital Signs Last 24 Hrs  T(C): 36.6 (25 Feb 2020 06:31), Max: 36.9 (25 Feb 2020 00:34)  T(F): 97.88 (25 Feb 2020 06:31), Max: 98.42 (25 Feb 2020 00:34)  HR: 87 (25 Feb 2020 10:23) (78 - 109)  BP: 133/76 (25 Feb 2020 10:23) (112/61 - 143/89)  RR: 18 (25 Feb 2020 06:31) (14 - 18)    Abdomen: soft, nontender   SVE: deferred, examined by Dr. Tam at approximately 1pm closed cervix     FHT: baseline 130s moderate variability, accelerations present, no decelerations   [place]: ctx irregular q 2-4 minutes     A/P   Patient is dong well, IOL for polyhydramnios on cytotec.   FHT category 1   Irregular contractions present, asymptomatic.   Will continue course of cytotec and consider cervical balloon placement if cervical exam permitting as per Dr. Tam

## 2020-02-25 NOTE — CHART NOTE - NSCHARTNOTEFT_GEN_A_CORE
S: Patient doing well with cytotec, feeling slightly crampy     O:   Vital Signs Last 24 Hrs  T(C): 36.7 (25 Feb 2020 14:51), Max: 36.9 (25 Feb 2020 00:34)  T(F): 98.06 (25 Feb 2020 14:51), Max: 98.42 (25 Feb 2020 00:34)  HR: 83 (25 Feb 2020 14:51) (78 - 109)  BP: 113/66 (25 Feb 2020 14:51) (112/61 - 143/89)  RR: 14 (25 Feb 2020 14:51) (14 - 18)    Abdomen: soft, nontender   FHT: baseline 140s, moderate variability accelerations present no decelerations   [place]: ctx q 2-4 minutes     SVE: 2/50/-3 vertex   Cook balloon placed, 80cc intrauterine, 80cc vaginal     A/P   Wen is progressing well on cytotec. Cook balloon placed.   Will receive 2 additional doses of cytotec. Anticipate that she will be rupturable with a favorable cervix after her last dose now with the balloon.   She will be moved to a front room in preparation for possible pitocin augmentation.   FHT category 1.     Dr. Tam present at bedside

## 2020-02-26 ENCOUNTER — RESULT REVIEW (OUTPATIENT)
Age: 30
End: 2020-02-26

## 2020-02-26 ENCOUNTER — TRANSCRIPTION ENCOUNTER (OUTPATIENT)
Age: 30
End: 2020-02-26

## 2020-02-26 PROCEDURE — 59515 CESAREAN DELIVERY: CPT

## 2020-02-26 PROCEDURE — 88307 TISSUE EXAM BY PATHOLOGIST: CPT | Mod: 26

## 2020-02-26 RX ORDER — IBUPROFEN 200 MG
600 TABLET ORAL EVERY 6 HOURS
Refills: 0 | Status: COMPLETED | OUTPATIENT
Start: 2020-02-26 | End: 2021-01-24

## 2020-02-26 RX ORDER — NALBUPHINE HYDROCHLORIDE 10 MG/ML
2.5 INJECTION, SOLUTION INTRAMUSCULAR; INTRAVENOUS; SUBCUTANEOUS EVERY 6 HOURS
Refills: 0 | Status: DISCONTINUED | OUTPATIENT
Start: 2020-02-26 | End: 2020-03-01

## 2020-02-26 RX ORDER — FAMOTIDINE 10 MG/ML
20 INJECTION INTRAVENOUS ONCE
Refills: 0 | Status: COMPLETED | OUTPATIENT
Start: 2020-02-26 | End: 2020-02-26

## 2020-02-26 RX ORDER — GLYCERIN ADULT
1 SUPPOSITORY, RECTAL RECTAL AT BEDTIME
Refills: 0 | Status: DISCONTINUED | OUTPATIENT
Start: 2020-02-26 | End: 2020-03-01

## 2020-02-26 RX ORDER — AZITHROMYCIN 500 MG/1
500 TABLET, FILM COATED ORAL ONCE
Refills: 0 | Status: COMPLETED | OUTPATIENT
Start: 2020-02-26 | End: 2020-02-26

## 2020-02-26 RX ORDER — DIPHENHYDRAMINE HCL 50 MG
25 CAPSULE ORAL EVERY 6 HOURS
Refills: 0 | Status: DISCONTINUED | OUTPATIENT
Start: 2020-02-26 | End: 2020-03-01

## 2020-02-26 RX ORDER — OXYCODONE HYDROCHLORIDE 5 MG/1
5 TABLET ORAL
Refills: 0 | Status: COMPLETED | OUTPATIENT
Start: 2020-02-26 | End: 2020-03-04

## 2020-02-26 RX ORDER — ACETAMINOPHEN 500 MG
975 TABLET ORAL
Refills: 0 | Status: DISCONTINUED | OUTPATIENT
Start: 2020-02-26 | End: 2020-03-01

## 2020-02-26 RX ORDER — ONDANSETRON 8 MG/1
4 TABLET, FILM COATED ORAL EVERY 6 HOURS
Refills: 0 | Status: DISCONTINUED | OUTPATIENT
Start: 2020-02-26 | End: 2020-03-01

## 2020-02-26 RX ORDER — OXYTOCIN 10 UNIT/ML
333.33 VIAL (ML) INJECTION
Qty: 20 | Refills: 0 | Status: DISCONTINUED | OUTPATIENT
Start: 2020-02-26 | End: 2020-03-01

## 2020-02-26 RX ORDER — MAGNESIUM HYDROXIDE 400 MG/1
30 TABLET, CHEWABLE ORAL
Refills: 0 | Status: DISCONTINUED | OUTPATIENT
Start: 2020-02-26 | End: 2020-03-01

## 2020-02-26 RX ORDER — CEFAZOLIN SODIUM 1 G
2000 VIAL (EA) INJECTION ONCE
Refills: 0 | Status: COMPLETED | OUTPATIENT
Start: 2020-02-26 | End: 2020-02-26

## 2020-02-26 RX ORDER — SODIUM CHLORIDE 9 MG/ML
1000 INJECTION, SOLUTION INTRAVENOUS
Refills: 0 | Status: DISCONTINUED | OUTPATIENT
Start: 2020-02-26 | End: 2020-03-01

## 2020-02-26 RX ORDER — TETANUS TOXOID, REDUCED DIPHTHERIA TOXOID AND ACELLULAR PERTUSSIS VACCINE, ADSORBED 5; 2.5; 8; 8; 2.5 [IU]/.5ML; [IU]/.5ML; UG/.5ML; UG/.5ML; UG/.5ML
0.5 SUSPENSION INTRAMUSCULAR ONCE
Refills: 0 | Status: DISCONTINUED | OUTPATIENT
Start: 2020-02-26 | End: 2020-03-01

## 2020-02-26 RX ORDER — KETOROLAC TROMETHAMINE 30 MG/ML
30 SYRINGE (ML) INJECTION ONCE
Refills: 0 | Status: DISCONTINUED | OUTPATIENT
Start: 2020-02-26 | End: 2020-03-01

## 2020-02-26 RX ORDER — KETOROLAC TROMETHAMINE 30 MG/ML
30 SYRINGE (ML) INJECTION EVERY 6 HOURS
Refills: 0 | Status: DISCONTINUED | OUTPATIENT
Start: 2020-02-26 | End: 2020-02-27

## 2020-02-26 RX ORDER — SIMETHICONE 80 MG/1
80 TABLET, CHEWABLE ORAL EVERY 4 HOURS
Refills: 0 | Status: DISCONTINUED | OUTPATIENT
Start: 2020-02-26 | End: 2020-03-01

## 2020-02-26 RX ORDER — LANOLIN
1 OINTMENT (GRAM) TOPICAL EVERY 6 HOURS
Refills: 0 | Status: DISCONTINUED | OUTPATIENT
Start: 2020-02-26 | End: 2020-03-01

## 2020-02-26 RX ORDER — OXYTOCIN 10 UNIT/ML
2 VIAL (ML) INJECTION
Qty: 30 | Refills: 0 | Status: DISCONTINUED | OUTPATIENT
Start: 2020-02-26 | End: 2020-02-26

## 2020-02-26 RX ORDER — NALOXONE HYDROCHLORIDE 4 MG/.1ML
0.1 SPRAY NASAL
Refills: 0 | Status: DISCONTINUED | OUTPATIENT
Start: 2020-02-26 | End: 2020-03-01

## 2020-02-26 RX ORDER — TRANEXAMIC ACID 100 MG/ML
1000 INJECTION, SOLUTION INTRAVENOUS ONCE
Refills: 0 | Status: COMPLETED | OUTPATIENT
Start: 2020-02-26 | End: 2020-02-26

## 2020-02-26 RX ORDER — DEXAMETHASONE 0.5 MG/5ML
4 ELIXIR ORAL EVERY 6 HOURS
Refills: 0 | Status: DISCONTINUED | OUTPATIENT
Start: 2020-02-26 | End: 2020-03-01

## 2020-02-26 RX ORDER — OXYCODONE HYDROCHLORIDE 5 MG/1
5 TABLET ORAL ONCE
Refills: 0 | Status: DISCONTINUED | OUTPATIENT
Start: 2020-02-26 | End: 2020-03-01

## 2020-02-26 RX ADMIN — Medication 30 MILLILITER(S): at 17:25

## 2020-02-26 RX ADMIN — Medication 108 GRAM(S): at 11:14

## 2020-02-26 RX ADMIN — Medication 30 MILLIGRAM(S): at 21:47

## 2020-02-26 RX ADMIN — Medication 108 GRAM(S): at 15:00

## 2020-02-26 RX ADMIN — Medication 30 MILLIGRAM(S): at 21:21

## 2020-02-26 RX ADMIN — TRANEXAMIC ACID 220 MILLIGRAM(S): 100 INJECTION, SOLUTION INTRAVENOUS at 18:05

## 2020-02-26 RX ADMIN — FAMOTIDINE 20 MILLIGRAM(S): 10 INJECTION INTRAVENOUS at 17:26

## 2020-02-26 RX ADMIN — SODIUM CHLORIDE 125 MILLILITER(S): 9 INJECTION, SOLUTION INTRAVENOUS at 18:21

## 2020-02-26 RX ADMIN — MOMETASONE FUROATE 2 PUFF(S): 220 INHALANT RESPIRATORY (INHALATION) at 21:47

## 2020-02-26 RX ADMIN — SODIUM CHLORIDE 125 MILLILITER(S): 9 INJECTION, SOLUTION INTRAVENOUS at 06:49

## 2020-02-26 RX ADMIN — AZITHROMYCIN 255 MILLIGRAM(S): 500 TABLET, FILM COATED ORAL at 17:54

## 2020-02-26 RX ADMIN — Medication 108 GRAM(S): at 03:20

## 2020-02-26 RX ADMIN — SODIUM CHLORIDE 125 MILLILITER(S): 9 INJECTION, SOLUTION INTRAVENOUS at 17:26

## 2020-02-26 RX ADMIN — Medication 2 MILLIUNIT(S)/MIN: at 06:05

## 2020-02-26 RX ADMIN — Medication 108 GRAM(S): at 06:49

## 2020-02-26 RX ADMIN — Medication 1000 MILLIUNIT(S)/MIN: at 21:22

## 2020-02-26 RX ADMIN — Medication 100 MILLIGRAM(S): at 17:49

## 2020-02-26 RX ADMIN — SODIUM CHLORIDE 125 MILLILITER(S): 9 INJECTION, SOLUTION INTRAVENOUS at 13:49

## 2020-02-26 NOTE — CHART NOTE - NSCHARTNOTEFT_GEN_A_CORE
Late entry.      02-26-20 @ 07:33    ICU Vital Signs Last 24 Hrs  T(C): 36.7 (26 Feb 2020 06:07), Max: 36.9 (26 Feb 2020 02:25)  T(F): 98.06 (26 Feb 2020 06:07), Max: 98.42 (26 Feb 2020 02:25)  HR: 105 (26 Feb 2020 07:31) (83 - 125)  BP: 120/71 (26 Feb 2020 07:31) (106/56 - 150/82)  RR: 16 (26 Feb 2020 04:59) (14 - 17)  SpO2: 98% (26 Feb 2020 07:32) (92% - 99%)        PLAN:  Patient s/p completion of cytotec regimen. Cook balloon still in place.   Overnight, patient continued to present with a reactive tracing with intermittent contractions. Contractions spaced out over the course of the night to every 5-10 min  Tracing and contraction pattern were discussed with Dr. Coleman at which time decision was made to proceed with pitocin augmentation at 06:00. Will start at 2 mu.  Patient to continue with clear liquid diet  Will alternate between D5LR and LR   S/P Epidural placement  Continuous FHT/[place]  Maternal/fetal status reassuring  Will continue to reassess prn.    Plan d/w Dr. Coleman

## 2020-02-26 NOTE — OB RN DELIVERY SUMMARY - NS_SEPSISRSKCALC_OBGYN_ALL_OB_FT
EOS calculated successfully. EOS Risk Factor: 0.5/1000 live births ([facility] national incidence); GA=38w5d; Temp=98.42; ROM=9.333; GBS='Positive'; Antibiotics='GBS specific antibiotics > 2 hrs prior to birth' EOS calculated successfully. EOS Risk Factor: 0.5/1000 live births ([facility] national incidence); GA=38w5d; Temp=98.42; ROM=9.333; GBS='Positive'; Antibiotics='GBS specific antibiotics > 2 hrs prior to birth'

## 2020-02-26 NOTE — OB RN DELIVERY SUMMARY - NSCSDELIVERYA_OBGYN_ALL_OB
3/8/2018              [phone]         Dear Ashley Lopez,    Our records indicate that the tests ordered for you by EDILBERTO Montes De Oca  have not been done.   If you have, in fact, already c
Primary

## 2020-02-26 NOTE — CHART NOTE - NSCHARTNOTEFT_GEN_A_CORE
S: Patient doing well, feeling more pressure     O:   Vital Signs Last 24 Hrs  T(C): 36.9 (26 Feb 2020 12:00), Max: 36.9 (26 Feb 2020 02:25)  T(F): 98.42 (26 Feb 2020 12:00), Max: 98.42 (26 Feb 2020 02:25)  HR: 112 (26 Feb 2020 14:33) (83 - 125)  BP: 131/68 (26 Feb 2020 14:33) (106/56 - 150/82)  RR: 16 (26 Feb 2020 04:59) (14 - 17)  SpO2: 100% (26 Feb 2020 14:32) (81% - 100%)    Abdomen: soft, nontender   SVE: 6.5/80/-1 vertex ruptured     FHT: baseline 150 moderate variability accels present no decelerations     [place]: ctx q 4 minutes     A/P   Wen is doing well, feeling more pressure. Cervix is slightly changed. Will continue pitocin augmentation (18units currently), she is doing well on the peanut ball.     Dr. Coleman informed.

## 2020-02-26 NOTE — CHART NOTE - NSCHARTNOTEFT_GEN_A_CORE
S: Patient doing well with pitocin, still comfortable feeling slightly crampy     O:   Vital Signs Last 24 Hrs  T(C): 36.7 (26 Feb 2020 06:07), Max: 36.9 (26 Feb 2020 02:25)  T(F): 98.06 (26 Feb 2020 06:07), Max: 98.42 (26 Feb 2020 02:25)  HR: 109 (26 Feb 2020 07:32) (83 - 125)  BP: 120/71 (26 Feb 2020 07:31) (106/56 - 150/82)  RR: 16 (26 Feb 2020 04:59) (14 - 17)  SpO2: 98% (26 Feb 2020 07:32) (92% - 99%)    Abdomen: soft, nontender   FHT: baseline 140s, moderate variability accelerations, - decelerations   [place]: ctx q 2-4 minutes     SVE: 2/50/-3 vertex @ 4:20 pm 2/25/2020   Cook balloon remains in place    A/P   Cook balloon in place. Conitnue with Pitocin, increasing as per protocol.   FHT category 1.    D/w Dr. Coleman

## 2020-02-26 NOTE — CHART NOTE - NSCHARTNOTEFT_GEN_A_CORE
Wen is doing well, she rested over night and she has excellent pain relief from her epidural.   She has a category 1 FHR tracing and she has UC's q 3-5 minutes.  A bedside sonogram was performed to r/o a funic presentation and to confirm vertex position.   A well controlled amniotomy was performed after the cervical balloon dislodged. Clear AF was noted. VE= 4-5cm/90%.-2 station. An IUPC and FSE were placed as the large abdominal size and difficulties with external monitoring.   We will optimize her contraction pattern with IV oxytocin and sit her upright. All questions were answered and anticipated progress was discussed.

## 2020-02-26 NOTE — OB NEONATOLOGY/PEDIATRICIAN DELIVERY SUMMARY - NSPEDSNEONOTESA_OBGYN_ALL_OB_FT
Requested by Dr Coleman to attend a PC/S of 30 y/o  woman at 38.6 weeks GA secondary to a failed IOL for polyhydramnios.  She had + PNC, is O pos, HBsAg neg, HIV neg, RPR NR, Rubella Imm, GBS pos.  L&D:  AROM aprox 9 hrs PTD with clear fliud.  She received multiple doses of Ampicillin PTD.  Baby born vertex with good cry, transferred towarmer, orally suctioned, dried, and examined.  baby showed to father and then transferred to mom.  A/P:  FT male  Transition to Regular Nursery under PMD care. Requested by Dr Coleman to attend a PC/S of 28 y/o  woman at 38.6 weeks GA secondary to a failed IOL for polyhydramnios.  She had + PNC, is O pos, HBsAg neg, HIV neg, RPR NR, Rubella Imm, GBS pos.  L&D:  AROM aprox 9 hrs PTD with clear fluid.  She received multiple doses of Ampicillin PTD.  Baby born vertex with good cry, delayed cord clamping, transferred to warmer, orally suctioned, dried, and examined.  Baby showed to father and then transferred to mom. BW: 4110g.  EOS: 0.42  A/P:  FT LGA male  Transition to Regular Nursery under PMD care.  Monitor glucose as per protocol.

## 2020-02-26 NOTE — OB PROVIDER DELIVERY SUMMARY - AS DELIV COMPLICATIONS OB
Left distal anterior thigh dermal cyst spontaneously drained last week.  It is now a fraction of the size and asymptomatic.    Exam:  Skin:  1 cm mass on the anterior distal left thigh with overlying punctum.  There is no erythema of the skin.  There is no active drainage.    A/P:  Jaswinder would like to continue observation.  I think this is appropriate.  He will call when and if the mass enlarges or become symptomatic.    This note was created using voice recognition software. Undetected word substitutions or other errors may have occurred.     Time spent of which more than 50% was counseling and coordinating care:  5 minutes.    Milena English MD     Please route or send letter to:  Primary Care Provider (PCP) and Referring Provider      
other/polyhydramnios

## 2020-02-26 NOTE — CHART NOTE - NSCHARTNOTEFT_GEN_A_CORE
Wen is doing well and she has a stable category 1 FHR tracing, UC's are q 1-2 minutes. IUPC and FSE in place and operational. VE=5cm/80%/-3 with clear AF noted.   We discussed the issues and she has a LGA baby on board. We will continue to monitor her progress and we discussed the potential for the need for  section if she does not progress. All questions were answered.

## 2020-02-27 LAB
BASOPHILS # BLD AUTO: 0.03 K/UL — SIGNIFICANT CHANGE UP (ref 0–0.2)
BASOPHILS NFR BLD AUTO: 0.2 % — SIGNIFICANT CHANGE UP (ref 0–2)
EOSINOPHIL # BLD AUTO: 0.02 K/UL — SIGNIFICANT CHANGE UP (ref 0–0.5)
EOSINOPHIL NFR BLD AUTO: 0.1 % — SIGNIFICANT CHANGE UP (ref 0–6)
HCT VFR BLD CALC: 28.8 % — LOW (ref 34.5–45)
HGB BLD-MCNC: 9.5 G/DL — LOW (ref 11.5–15.5)
IMM GRANULOCYTES NFR BLD AUTO: 0.5 % — SIGNIFICANT CHANGE UP (ref 0–1.5)
LYMPHOCYTES # BLD AUTO: 1.67 K/UL — SIGNIFICANT CHANGE UP (ref 1–3.3)
LYMPHOCYTES # BLD AUTO: 9.9 % — LOW (ref 13–44)
MCHC RBC-ENTMCNC: 29.5 PG — SIGNIFICANT CHANGE UP (ref 27–34)
MCHC RBC-ENTMCNC: 33 GM/DL — SIGNIFICANT CHANGE UP (ref 32–36)
MCV RBC AUTO: 89.4 FL — SIGNIFICANT CHANGE UP (ref 80–100)
MONOCYTES # BLD AUTO: 1.14 K/UL — HIGH (ref 0–0.9)
MONOCYTES NFR BLD AUTO: 6.8 % — SIGNIFICANT CHANGE UP (ref 2–14)
NEUTROPHILS # BLD AUTO: 13.86 K/UL — HIGH (ref 1.8–7.4)
NEUTROPHILS NFR BLD AUTO: 82.5 % — HIGH (ref 43–77)
PLATELET # BLD AUTO: 123 K/UL — LOW (ref 150–400)
RBC # BLD: 3.22 M/UL — LOW (ref 3.8–5.2)
RBC # FLD: 13.2 % — SIGNIFICANT CHANGE UP (ref 10.3–14.5)
WBC # BLD: 16.81 K/UL — HIGH (ref 3.8–10.5)
WBC # FLD AUTO: 16.81 K/UL — HIGH (ref 3.8–10.5)

## 2020-02-27 RX ORDER — ENOXAPARIN SODIUM 100 MG/ML
40 INJECTION SUBCUTANEOUS DAILY
Refills: 0 | Status: COMPLETED | OUTPATIENT
Start: 2020-02-27 | End: 2020-02-29

## 2020-02-27 RX ORDER — OXYCODONE HYDROCHLORIDE 5 MG/1
5 TABLET ORAL
Refills: 0 | Status: DISCONTINUED | OUTPATIENT
Start: 2020-02-27 | End: 2020-03-01

## 2020-02-27 RX ORDER — IBUPROFEN 200 MG
600 TABLET ORAL EVERY 6 HOURS
Refills: 0 | Status: DISCONTINUED | OUTPATIENT
Start: 2020-02-27 | End: 2020-03-01

## 2020-02-27 RX ADMIN — Medication 975 MILLIGRAM(S): at 17:52

## 2020-02-27 RX ADMIN — ENOXAPARIN SODIUM 40 MILLIGRAM(S): 100 INJECTION SUBCUTANEOUS at 12:53

## 2020-02-27 RX ADMIN — Medication 30 MILLIGRAM(S): at 15:42

## 2020-02-27 RX ADMIN — Medication 975 MILLIGRAM(S): at 12:52

## 2020-02-27 RX ADMIN — Medication 975 MILLIGRAM(S): at 06:16

## 2020-02-27 RX ADMIN — SIMETHICONE 80 MILLIGRAM(S): 80 TABLET, CHEWABLE ORAL at 15:28

## 2020-02-27 RX ADMIN — SIMETHICONE 80 MILLIGRAM(S): 80 TABLET, CHEWABLE ORAL at 08:56

## 2020-02-27 RX ADMIN — Medication 30 MILLIGRAM(S): at 03:49

## 2020-02-27 RX ADMIN — Medication 600 MILLIGRAM(S): at 20:35

## 2020-02-27 RX ADMIN — Medication 600 MILLIGRAM(S): at 20:26

## 2020-02-27 RX ADMIN — Medication 30 MILLIGRAM(S): at 04:41

## 2020-02-27 RX ADMIN — Medication 30 MILLIGRAM(S): at 09:11

## 2020-02-27 RX ADMIN — Medication 975 MILLIGRAM(S): at 07:00

## 2020-02-27 RX ADMIN — Medication 30 MILLIGRAM(S): at 15:27

## 2020-02-27 RX ADMIN — Medication 975 MILLIGRAM(S): at 13:22

## 2020-02-27 RX ADMIN — Medication 975 MILLIGRAM(S): at 01:40

## 2020-02-27 RX ADMIN — Medication 975 MILLIGRAM(S): at 00:42

## 2020-02-27 RX ADMIN — Medication 30 MILLIGRAM(S): at 08:56

## 2020-02-27 NOTE — DISCHARGE NOTE OB - CARE PLAN
Principal Discharge DX:	 delivery delivered  Goal:	Rapid recovery  Assessment and plan of treatment:	Patient should transition to regular activity level. Resume regular diet. Patient should follow up with her OB for a postpartum checkup 2 weeks after delivery. Patient should call her doctor sooner if she develops a fever or uncontrolled vaginal bleeding or fevers. Please call sooner if there are any other concerns.

## 2020-02-27 NOTE — DISCHARGE NOTE OB - PATIENT PORTAL LINK FT
You can access the FollowMyHealth Patient Portal offered by [facility] by registering at the following website: [email or web address]. By joining [facility]’s FollowMyHealth portal, you will also be able to view your health information using other applications (apps) compatible with our system.

## 2020-02-27 NOTE — DISCHARGE NOTE OB - HOSPITAL COURSE
She is a 29 y.o now a  who presented for a scheduled induction of labor @38w2d for polyhydramnios. Patient had a primary  section due to arrest of dilation. She had an uncomplicated surgery and postpartum course. She was discharged home in stable condition. She is a 29 y.o now a  who presented for a scheduled induction of labor @38w2d for polyhydramnios. Patient had a primary  section due to arrest of dilation. She had an uncomplicated surgery and postpartum course. She was discharged home in stable condition. Patient was sent home on iron supplements for anemia due to acute blood loss

## 2020-02-27 NOTE — DISCHARGE NOTE OB - CARE PROVIDER_API CALL
Francisco Coleman (MD)  Obstetrics and Gynecology  [address] [place] [address]  Phone: [phone]  Fax: [phone]  Follow Up Time:

## 2020-02-27 NOTE — DISCHARGE NOTE OB - CARE PROVIDERS DIRECT ADDRESSES
[email or web address]

## 2020-02-27 NOTE — DISCHARGE NOTE OB - PLAN OF CARE
Rapid recovery Patient should transition to regular activity level. Resume regular diet. Patient should follow up with her OB for a postpartum checkup 2 weeks after delivery. Patient should call her doctor sooner if she develops a fever or uncontrolled vaginal bleeding or fevers. Please call sooner if there are any other concerns.

## 2020-02-27 NOTE — PROGRESS NOTE ADULT - ASSESSMENT
ASSESSMENT  MAGEN EWING is a 29y  who is post-op day #1 who delivered a male infant at term by primary  delivery for arrest of labor.  No acute events.     PLAN  1. Routine post-op care  - Continue to encourage ambulation, PO intake and breastfeeding  - DVT prophylaxis: lovenox, SCDs  - Continue pain management PRN  - Plan to discharge post-op day 3

## 2020-02-27 NOTE — PROGRESS NOTE ADULT - SUBJECTIVE AND OBJECTIVE BOX
Delivery Progress Note    MAGEN EWING is a 29y  who is post-op day #1 who delivered a male infant at term by primary  delivery for arrest of labor after failed induction of labor for polyhydramnios     SUBJECTIVE:  No acute events overnight. Pain is well controlled with PRN pain medication. No problems with PO intake. Has had flatus but no BM. Denies nausea and vomiting. Patient is having appropriate lochia which is decreasing.     OBJECTIVE:  Physical exam:  Vital Signs Last 24 Hrs  T(C): 36.8 (2020 04:00), Max: 36.9 (2020 12:00)  T(F): 98.3 (2020 04:00), Max: 98.42 (2020 12:00)  HR: 63 (2020 04:00) (63 - 141)  BP: 96/57 (2020 04:00) (96/57 - 179/97)  RR: 20 (2020 04:00) (14 - 22)  SpO2: 95% (2020 04:00) (81% - 100%)  General: alert and oriented x3, no acute distress.  Heart: regular rate and rhythm, no murmurs, rubs or gallops appreciated.  Lungs: clear to auscultation bilaterally.   breast: soft, no tenderness to palpation.  Abdomen: Soft, appropriately tender to palpitation, firm uterine fundus at umbilicus. SHILOH dressing appears dry and intact   Extremities: no tenderness, redness or swelling in lower extremities bilaterally.     Labs:

## 2020-02-27 NOTE — DISCHARGE NOTE OB - MEDICATION SUMMARY - MEDICATIONS TO TAKE
I will START or STAY ON the medications listed below when I get home from the hospital:    ibuprofen 600 mg oral tablet  -- 1 tab(s) by mouth every 6 hours MDD:4  -- Do not take this drug if you are pregnant.  It is very important that you take or use this exactly as directed.  Do not skip doses or discontinue unless directed by your doctor.  May cause drowsiness or dizziness.  Obtain medical advice before taking any non-prescription drugs as some may affect the action of this medication.  Take with food or milk.    -- Indication: For PAIN    albuterol 90 mcg/inh inhalation aerosol  -- 2 puff(s) inhaled every 6 hours, As needed, Shortness of Breath and/or Wheezing  -- Indication: For Asthma    ferrous sulfate 325 mg (65 mg elemental iron) oral delayed release tablet  -- 1 tab(s) by mouth 2 times a day   -- May discolor urine or feces.  Swallow whole.  Do not crush.    -- Indication: For ANEMIA    docusate sodium 100 mg oral tablet  -- 1 tab(s) by mouth 2 times a day   -- Medication should be taken with plenty of water.    -- Indication: For CONSTIPATION

## 2020-02-27 NOTE — DISCHARGE NOTE OB - MATERIALS PROVIDED
[facility] Screening Program/[facility] Hearing Screen Program/Shaken Baby Prevention Handout/Breastfeeding Log/Breastfeeding Mother’s Support Group Information/Guide to Postpartum Care/Back To Sleep Handout/Tdap Vaccination (VIS Pub Date: 2012)/Breastfeeding Guide and Packet/  Immunization Record

## 2020-02-27 NOTE — DISCHARGE NOTE OB - NS AS DC STROKE DX YN
Received shift report and assumed care of patient.  Patient awake, calm and stable, currently positioned in bed for comfort and safety; call light within reach.  Denies pain or discomfort at this time.  Will continue to monitor.   no

## 2020-02-28 ENCOUNTER — APPOINTMENT (OUTPATIENT)
Dept: ANTEPARTUM | Facility: CLINIC | Age: 30
End: 2020-02-28

## 2020-02-28 RX ADMIN — Medication 975 MILLIGRAM(S): at 18:04

## 2020-02-28 RX ADMIN — Medication 975 MILLIGRAM(S): at 11:51

## 2020-02-28 RX ADMIN — SIMETHICONE 80 MILLIGRAM(S): 80 TABLET, CHEWABLE ORAL at 20:50

## 2020-02-28 RX ADMIN — Medication 600 MILLIGRAM(S): at 15:23

## 2020-02-28 RX ADMIN — PHENYLEPHRINE-SHARK LIVER OIL-MINERAL OIL-PETROLATUM RECTAL OINTMENT 1 APPLICATION(S): at 23:00

## 2020-02-28 RX ADMIN — Medication 600 MILLIGRAM(S): at 20:50

## 2020-02-28 RX ADMIN — MAGNESIUM HYDROXIDE 30 MILLILITER(S): 400 TABLET, CHEWABLE ORAL at 20:50

## 2020-02-28 RX ADMIN — MOMETASONE FUROATE 2 PUFF(S): 220 INHALANT RESPIRATORY (INHALATION) at 22:11

## 2020-02-28 RX ADMIN — Medication 600 MILLIGRAM(S): at 09:00

## 2020-02-28 RX ADMIN — Medication 975 MILLIGRAM(S): at 12:19

## 2020-02-28 RX ADMIN — Medication 600 MILLIGRAM(S): at 21:30

## 2020-02-28 RX ADMIN — Medication 600 MILLIGRAM(S): at 09:30

## 2020-02-28 RX ADMIN — ENOXAPARIN SODIUM 40 MILLIGRAM(S): 100 INJECTION SUBCUTANEOUS at 11:51

## 2020-02-28 RX ADMIN — Medication 975 MILLIGRAM(S): at 18:34

## 2020-02-28 RX ADMIN — Medication 600 MILLIGRAM(S): at 15:53

## 2020-02-28 RX ADMIN — Medication 975 MILLIGRAM(S): at 05:28

## 2020-02-28 RX ADMIN — Medication 975 MILLIGRAM(S): at 23:56

## 2020-02-28 RX ADMIN — SIMETHICONE 80 MILLIGRAM(S): 80 TABLET, CHEWABLE ORAL at 07:51

## 2020-02-28 NOTE — PROGRESS NOTE ADULT - SUBJECTIVE AND OBJECTIVE BOX
Delivery Progress Note    MAGEN EWING is a 29y  who is post-op day #2 who delivered a male infant at term by primary  delivery for arrest of labor after failed induction of labor for polyhydramnios     SUBJECTIVE:  No acute events overnight. Pain is well controlled with PRN pain medication. No problems with PO intake. Has had flatus but no BM. Denies nausea and vomiting. Patient is having appropriate lochia which is decreasing. Patient is ambulating and voiding spontaneously without difficulty.     OBJECTIVE:  Physical exam:  Vital Signs Last 24 Hrs  T(C): 36.3 (2020 20:54), Max: 36.7 (2020 16:38)  T(F): 97.4 (2020 20:54), Max: 98 (2020 16:38)  HR: 84 (2020 20:54) (77 - 84)  BP: 106/68 (2020 20:54) (95/61 - 106/68)  RR: 20 (2020 20:54) (20 - 20)  SpO2: 97% (2020 20:54) (95% - 97%)  General: alert and oriented x3, no acute distress.  Heart: regular rate and rhythm, no murmurs, rubs or gallops appreciated.  Lungs: clear to auscultation bilaterally.   breast: soft, no tenderness to palpation.  Abdomen: Soft, appropriately tender to palpitation, firm uterine fundus at umbilicus. SHILOH dressing appears dry and intact   Extremities: no tenderness, redness or swelling in lower extremities bilaterally.     Labs:                           9.5    16.81 )-----------( 123      ( 2020 06:22 )             28.8

## 2020-02-28 NOTE — PROGRESS NOTE ADULT - ASSESSMENT
ASSESSMENT  MAGEN EWING is a 29y  who is post-op day #2 who delivered a male infant at term by primary  delivery for arrest of labor.  No acute events.     PLAN  1. Routine post-op care  - Continue to encourage ambulation, PO intake and breastfeeding  - DVT prophylaxis: lovenox, SCDs  - Continue pain management PRN  - Plan to discharge post-op day 3

## 2020-02-28 NOTE — PROGRESS NOTE ADULT - ATTENDING COMMENTS
Wen is doing well and she will plan for discharge tomorrow. All questions were answered and instructions given. She will RTO in 1 week for an incision check and SHILOH dressing change.

## 2020-02-29 RX ORDER — IBUPROFEN 200 MG
1 TABLET ORAL
Qty: 30 | Refills: 0
Start: 2020-02-29

## 2020-02-29 RX ORDER — DOCUSATE SODIUM 100 MG
1 CAPSULE ORAL
Qty: 60 | Refills: 0
Start: 2020-02-29 | End: 2020-03-29

## 2020-02-29 RX ORDER — ALBUTEROL 90 UG/1
2 AEROSOL, METERED ORAL
Qty: 0 | Refills: 0 | DISCHARGE
Start: 2020-02-29

## 2020-02-29 RX ORDER — FERROUS SULFATE 325(65) MG
1 TABLET ORAL
Qty: 60 | Refills: 0
Start: 2020-02-29 | End: 2020-03-29

## 2020-02-29 RX ADMIN — Medication 600 MILLIGRAM(S): at 02:54

## 2020-02-29 RX ADMIN — Medication 600 MILLIGRAM(S): at 15:21

## 2020-02-29 RX ADMIN — PHENYLEPHRINE-SHARK LIVER OIL-MINERAL OIL-PETROLATUM RECTAL OINTMENT 1 APPLICATION(S): at 12:57

## 2020-02-29 RX ADMIN — MOMETASONE FUROATE 2 PUFF(S): 220 INHALANT RESPIRATORY (INHALATION) at 22:02

## 2020-02-29 RX ADMIN — ENOXAPARIN SODIUM 40 MILLIGRAM(S): 100 INJECTION SUBCUTANEOUS at 12:57

## 2020-02-29 RX ADMIN — Medication 600 MILLIGRAM(S): at 22:45

## 2020-02-29 RX ADMIN — Medication 600 MILLIGRAM(S): at 03:30

## 2020-02-29 RX ADMIN — Medication 975 MILLIGRAM(S): at 19:02

## 2020-02-29 RX ADMIN — SIMETHICONE 80 MILLIGRAM(S): 80 TABLET, CHEWABLE ORAL at 21:59

## 2020-02-29 RX ADMIN — Medication 600 MILLIGRAM(S): at 09:53

## 2020-02-29 RX ADMIN — Medication 975 MILLIGRAM(S): at 17:58

## 2020-02-29 RX ADMIN — PHENYLEPHRINE-SHARK LIVER OIL-MINERAL OIL-PETROLATUM RECTAL OINTMENT 1 APPLICATION(S): at 19:05

## 2020-02-29 RX ADMIN — Medication 975 MILLIGRAM(S): at 06:15

## 2020-02-29 RX ADMIN — Medication 975 MILLIGRAM(S): at 05:32

## 2020-02-29 RX ADMIN — Medication 975 MILLIGRAM(S): at 13:30

## 2020-02-29 RX ADMIN — Medication 600 MILLIGRAM(S): at 16:04

## 2020-02-29 RX ADMIN — Medication 600 MILLIGRAM(S): at 21:59

## 2020-02-29 RX ADMIN — Medication 600 MILLIGRAM(S): at 10:50

## 2020-02-29 RX ADMIN — Medication 975 MILLIGRAM(S): at 00:30

## 2020-02-29 RX ADMIN — Medication 975 MILLIGRAM(S): at 12:55

## 2020-02-29 NOTE — PROGRESS NOTE ADULT - PROBLEM SELECTOR PLAN 1
Patient feels well  Hgb:  11.2  --> 9.5  Continue the current pain medication  Encourage  Ambulation  Encourage regular diet  DVT ppx: SCDs only when not ambulating  She is stable, tolerates a diet   Plan for discharge today pending attending assessment

## 2020-02-29 NOTE — PROGRESS NOTE ADULT - SUBJECTIVE AND OBJECTIVE BOX
MAGEN EWING is a 29y  who is post-op day #3 who delivered a male infant at term by primary  delivery for arrest of labor after failed induction of labor for polyhydramnios     S:    Patient seen and examined at bedside this AM.  Patient states that she is feeling somewhat overwhelmed because baby has been cluster feeding  Otherwise, she is describing some pain when sitting upright, but states it is moderately controlled with PRN pain meds.  She is breastfeeding at bedside and tolerating PO.   + flatus/+BM     O:    T(C): 36.6 (20 @ 20:49), Max: 36.7 (20 @ 08:15)  HR: 87 (20 @ 20:49) (74 - 87)  BP: 118/77 (20 @ 20:49) (118/77 - 126/85)  RR: 18 (20 @ 20:49) (18 - 20)  SpO2: 97% (20 @ 20:49) (94% - 97%)      Breast: Nontender, not engorged   Abdomen:  Soft, appropriately-tender, non-distended, +bowel sounds.  Uterus:  Fundus firm below umbilicus  Incision: SHILOH dressing. Clean/dry/intact  VE:  +lochia  Ext:  Non-tender, +2 pitting edema                           9.5    16.81 )-----------( 123      ( 2020 06:22 )             28.8

## 2020-02-29 NOTE — PROGRESS NOTE ADULT - ASSESSMENT
KAYLIN MAGEN is a 29y  who is post-op day #3 who delivered a male infant at term by primary  delivery for arrest of labor after failed induction of labor for polyhydramnios. No acute overnight events

## 2020-03-01 VITALS
DIASTOLIC BLOOD PRESSURE: 81 MMHG | TEMPERATURE: 98 F | SYSTOLIC BLOOD PRESSURE: 119 MMHG | RESPIRATION RATE: 18 BRPM | HEART RATE: 69 BPM

## 2020-03-01 PROCEDURE — 88307 TISSUE EXAM BY PATHOLOGIST: CPT

## 2020-03-01 PROCEDURE — 36415 COLL VENOUS BLD VENIPUNCTURE: CPT

## 2020-03-01 PROCEDURE — G0463: CPT

## 2020-03-01 PROCEDURE — 86901 BLOOD TYPING SEROLOGIC RH(D): CPT

## 2020-03-01 PROCEDURE — 86850 RBC ANTIBODY SCREEN: CPT

## 2020-03-01 PROCEDURE — 59050 FETAL MONITOR W/REPORT: CPT

## 2020-03-01 PROCEDURE — 81001 URINALYSIS AUTO W/SCOPE: CPT

## 2020-03-01 PROCEDURE — 86900 BLOOD TYPING SEROLOGIC ABO: CPT

## 2020-03-01 PROCEDURE — 85027 COMPLETE CBC AUTOMATED: CPT

## 2020-03-01 PROCEDURE — 59025 FETAL NON-STRESS TEST: CPT

## 2020-03-01 PROCEDURE — 86780 TREPONEMA PALLIDUM: CPT

## 2020-03-01 PROCEDURE — 94640 AIRWAY INHALATION TREATMENT: CPT

## 2020-03-01 RX ADMIN — SIMETHICONE 80 MILLIGRAM(S): 80 TABLET, CHEWABLE ORAL at 03:23

## 2020-03-01 RX ADMIN — Medication 975 MILLIGRAM(S): at 06:40

## 2020-03-01 RX ADMIN — Medication 600 MILLIGRAM(S): at 04:00

## 2020-03-01 RX ADMIN — Medication 600 MILLIGRAM(S): at 08:29

## 2020-03-01 RX ADMIN — Medication 975 MILLIGRAM(S): at 01:30

## 2020-03-01 RX ADMIN — Medication 600 MILLIGRAM(S): at 03:23

## 2020-03-01 RX ADMIN — Medication 600 MILLIGRAM(S): at 09:10

## 2020-03-01 RX ADMIN — Medication 975 MILLIGRAM(S): at 07:13

## 2020-03-01 RX ADMIN — Medication 975 MILLIGRAM(S): at 00:40

## 2020-03-01 NOTE — PROGRESS NOTE ADULT - ASSESSMENT
KAYLIN MAGEN is a 29y  who is post-op day #4 who delivered a male infant at term by primary  delivery for arrest of labor after failed induction of labor for polyhydramnios. No acute overnight events

## 2020-03-01 NOTE — PROGRESS NOTE ADULT - SUBJECTIVE AND OBJECTIVE BOX
MAGEN EWING is a 29y  who is post-op day #4 who delivered a male infant at term by primary  delivery for arrest of labor after failed induction of labor for polyhydramnios     S:    Patient seen and examined at bedside this AM.  Patient states that she is feeling well, no complaints.   Pain is well controlled with ibuprofen and tylenol.   She is breastfeeding at bedside and tolerating PO.   + flatus/+BM     O:    Vital Signs Last 24 Hrs  T(C): 36.7 (2020 19:50), Max: 36.8 (2020 08:42)  T(F): 98 (2020 19:50), Max: 98.2 (2020 08:42)  HR: 82 (2020 19:50) (76 - 83)  BP: 128/83 (2020 19:50) (102/68 - 128/83)  RR: 20 (2020 19:50) (18 - 20)  SpO2: 97% (2020 19:50) (96% - 97%)    PE:  Breast: Nontender, not engorged   Abdomen:  Soft, appropriately-tender, non-distended, +bowel sounds.  Uterus:  Fundus firm below umbilicus  Incision: SHILOH dressing. Clean/dry/intact  VE:  +lochia  Ext:  Non-tender, +2 pitting edema                               9.5    16.81 )-----------( 123      ( 2020 06:22 )             28.8

## 2020-03-03 PROBLEM — J45.909 UNSPECIFIED ASTHMA, UNCOMPLICATED: Chronic | Status: ACTIVE | Noted: 2020-02-23

## 2020-03-04 ENCOUNTER — APPOINTMENT (OUTPATIENT)
Dept: OBGYN | Facility: CLINIC | Age: 30
End: 2020-03-04
Payer: COMMERCIAL

## 2020-03-04 VITALS
HEIGHT: 63 IN | WEIGHT: 222 LBS | DIASTOLIC BLOOD PRESSURE: 70 MMHG | SYSTOLIC BLOOD PRESSURE: 120 MMHG | BODY MASS INDEX: 39.34 KG/M2

## 2020-03-04 DIAGNOSIS — Z82.49 FAMILY HISTORY OF ISCHEMIC HEART DISEASE AND OTHER DISEASES OF THE CIRCULATORY SYSTEM: ICD-10-CM

## 2020-03-04 DIAGNOSIS — Z80.3 FAMILY HISTORY OF MALIGNANT NEOPLASM OF BREAST: ICD-10-CM

## 2020-03-04 DIAGNOSIS — O28.9 UNSPECIFIED ABNORMAL FINDINGS ON ANTENATAL SCREENING OF MOTHER: ICD-10-CM

## 2020-03-04 DIAGNOSIS — Z3A.24 24 WEEKS GESTATION OF PREGNANCY: ICD-10-CM

## 2020-03-04 DIAGNOSIS — Z3A.38 38 WEEKS GESTATION OF PREGNANCY: ICD-10-CM

## 2020-03-04 DIAGNOSIS — O09.899 UNSPECIFIED ABNORMAL FINDINGS ON ANTENATAL SCREENING OF MOTHER: ICD-10-CM

## 2020-03-04 DIAGNOSIS — O40.3XX0 POLYHYDRAMNIOS, THIRD TRIMESTER, NOT APPLICABLE OR UNSPECIFIED: ICD-10-CM

## 2020-03-04 DIAGNOSIS — Z3A.36 36 WEEKS GESTATION OF PREGNANCY: ICD-10-CM

## 2020-03-04 DIAGNOSIS — Z84.81 FAMILY HISTORY OF CARRIER OF GENETIC DISEASE: ICD-10-CM

## 2020-03-04 DIAGNOSIS — Z34.92 ENCOUNTER FOR SUPERVISION OF NORMAL PREGNANCY, UNSPECIFIED, SECOND TRIMESTER: ICD-10-CM

## 2020-03-04 DIAGNOSIS — Z34.93 ENCOUNTER FOR SUPERVISION OF NORMAL PREGNANCY, UNSPECIFIED, THIRD TRIMESTER: ICD-10-CM

## 2020-03-04 PROCEDURE — 99024 POSTOP FOLLOW-UP VISIT: CPT

## 2020-03-05 LAB — SURGICAL PATHOLOGY STUDY: SIGNIFICANT CHANGE UP

## 2020-03-08 PROBLEM — Z3A.24 24 WEEKS GESTATION OF PREGNANCY: Status: RESOLVED | Noted: 2019-11-21 | Resolved: 2020-03-08

## 2020-03-08 PROBLEM — O28.9 HIGH RISK PREGNANCY WITH ELEVATED HCG (HUMAN CHORIONIC GONADOTROPIN): Status: RESOLVED | Noted: 2020-02-14 | Resolved: 2020-03-08

## 2020-03-08 PROBLEM — Z34.93 THIRD TRIMESTER PREGNANCY: Status: RESOLVED | Noted: 2020-02-14 | Resolved: 2020-03-08

## 2020-03-08 PROBLEM — Z82.49 FAMILY HISTORY OF MYOCARDIAL INFARCTION: Status: ACTIVE | Noted: 2020-01-14

## 2020-03-08 PROBLEM — Z84.81 FAMILY HISTORY OF BRCA GENE POSITIVE: Status: ACTIVE | Noted: 2019-08-05

## 2020-03-08 PROBLEM — Z34.92 SECOND TRIMESTER PREGNANCY: Status: RESOLVED | Noted: 2019-11-21 | Resolved: 2020-03-08

## 2020-03-08 PROBLEM — O40.3XX0 POLYHYDRAMNIOS IN THIRD TRIMESTER: Status: RESOLVED | Noted: 2020-02-14 | Resolved: 2020-03-08

## 2020-03-08 PROBLEM — Z3A.38 38 WEEKS GESTATION OF PREGNANCY: Status: RESOLVED | Noted: 2020-02-21 | Resolved: 2020-03-08

## 2020-03-08 PROBLEM — Z3A.36 36 WEEKS GESTATION OF PREGNANCY: Status: RESOLVED | Noted: 2020-02-07 | Resolved: 2020-03-08

## 2020-03-08 PROBLEM — Z80.3 FAMILY HISTORY OF MALIGNANT NEOPLASM OF BREAST: Status: ACTIVE | Noted: 2019-08-05

## 2020-03-08 NOTE — PHYSICAL EXAM
[Awake] : awake [Alert] : alert [Soft] : soft [Oriented x3] : oriented to person, place, and time [RRR, No Murmurs] : RRR, no murmurs [CTAB] : CTAB [Tender] : non tender [Distended] : not distended [Depressed Mood] : not depressed

## 2020-03-08 NOTE — HISTORY OF PRESENT ILLNESS
[Good] : being in good health [___ Month(s) Ago] : [unfilled] month(s) ago [Reproductive Age] : is of reproductive age [Last Pap ___] : Last cervical pap smear was [unfilled] [Pregnancy History] : pregnancy history: [Definite:  ___ (Date)] : the last menstrual period was [unfilled] [Monogamous] : is monogamous [Male ___] : [unfilled] male [Menstrual Problems] : reports normal menses [Fever] : no fever [Nausea] : no nausea [Vomiting] : no vomiting [Diarrhea] : no diarrhea [Vaginal Bleeding] : no vaginal bleeding [Pelvic Pressure] : no pelvic pressure [Dysuria] : no dysuria [Sexually Active] : is not sexually active [Contraception] : does not use contraception

## 2020-03-13 ENCOUNTER — APPOINTMENT (OUTPATIENT)
Dept: OBGYN | Facility: CLINIC | Age: 30
End: 2020-03-13
Payer: COMMERCIAL

## 2020-03-13 VITALS
DIASTOLIC BLOOD PRESSURE: 60 MMHG | WEIGHT: 212 LBS | SYSTOLIC BLOOD PRESSURE: 106 MMHG | HEIGHT: 63 IN | BODY MASS INDEX: 37.56 KG/M2

## 2020-03-13 DIAGNOSIS — A49.1 STREPTOCOCCAL INFECTION, UNSPECIFIED SITE: ICD-10-CM

## 2020-03-13 PROCEDURE — 99024 POSTOP FOLLOW-UP VISIT: CPT

## 2020-04-02 NOTE — CDI QUERY NOTE - NSCDIOTHERTXTBX_GEN_ALL_CORE_HH
This patient underwent a  on 20.  The OP note documents an EBL of 500ml.  She was discharged on iron pills for "anemia".  Her hgb levels were noted to decrease from 11.2 to 9.5.    Can the type of anemia be clarified, after study?    -patient was sent home on iron supplements for anemia due to acute blood loss  -patient was sent home on iron supplements for acute blood loss anemia  -Other anemia (please specify)  -Not clinically significant    CHART DOCUMENTATION:    D/C note OB:  Discharge Medication Review:  ferrous sulfate 325 mg (65 mg elemental iron) oral delayed release tablet  -- 1 tab(s) by mouth 2 times a day   -- May discolor urine or feces.  Swallow whole.  Do not crush.    -- Indication: For ANEMIA      OP report:  Estimated Blood Loss: 500mL.        Hemoglobin: 11.2 g/dL (20 @ 22:13)  Hemoglobin: 9.5 g/dL (20 @ 06:22)    Hematocrit: 35.0 % (20 @ 22:13)  Hematocrit: 28.8 % (20 @ 06:22)

## 2020-05-01 ENCOUNTER — APPOINTMENT (OUTPATIENT)
Dept: OBGYN | Facility: CLINIC | Age: 30
End: 2020-05-01
Payer: COMMERCIAL

## 2020-05-01 VITALS
DIASTOLIC BLOOD PRESSURE: 64 MMHG | WEIGHT: 213 LBS | HEIGHT: 63 IN | BODY MASS INDEX: 37.74 KG/M2 | SYSTOLIC BLOOD PRESSURE: 108 MMHG

## 2020-05-01 PROCEDURE — 0503F POSTPARTUM CARE VISIT: CPT

## 2020-05-01 PROCEDURE — 99213 OFFICE O/P EST LOW 20 MIN: CPT

## 2020-05-01 RX ORDER — ALBUTEROL SULFATE 90 UG/1
108 (90 BASE) AEROSOL, METERED RESPIRATORY (INHALATION)
Refills: 0 | Status: ACTIVE | COMMUNITY

## 2020-05-03 NOTE — CHIEF COMPLAINT
[Post-Partum Visit] : post-partum visit [FreeTextEntry1] : Post partum visit, C/S 02/26/2020 "River" 9lbs 1oz Breast and Formula Feeding

## 2020-05-03 NOTE — PHYSICAL EXAM
[Awake] : awake [Acute Distress] : no acute distress [Alert] : alert [Mass] : no breast mass [Nipple Discharge] : no nipple discharge [Tender] : non tender [Soft] : soft [Distended] : not distended [Labia Majora] : labia major [Normal] : clitoris [Labia Minora] : labia minora

## 2020-05-03 NOTE — HISTORY OF PRESENT ILLNESS
[Last Pap ___] : Last cervical pap smear was [unfilled] [Reproductive Age] : is of reproductive age [Pregnancy History] : pregnancy history: [Total Preg ___] : [unfilled] [Living ___] : [unfilled] [Full Term ___] : [unfilled] [Definite:  ___ (Date)] : the last menstrual period was [unfilled] [Menarche Age: ____] : age at menarche was [unfilled] [Male ___] : [unfilled] male [Menstrual Problems] : reports normal menses [Contraception] : does not use contraception [Nausea] : no nausea [Vomiting] : no vomiting [Fever] : no fever [Pelvic Pressure] : no pelvic pressure [Vaginal Bleeding] : no vaginal bleeding [Diarrhea] : no diarrhea [Sexually Active] : is not sexually active [Dysuria] : no dysuria

## 2020-05-06 ENCOUNTER — MED ADMIN CHARGE (OUTPATIENT)
Age: 30
End: 2020-05-06

## 2020-05-06 ENCOUNTER — RX CHANGE (OUTPATIENT)
Age: 30
End: 2020-05-06

## 2020-05-10 NOTE — CHIEF COMPLAINT
[Post-Partum Visit] : post-partum visit [FreeTextEntry1] : C/S 02/26/20 \par MALE "KG" 9LBS 1OZ\par BREASTFEEDING

## 2020-05-10 NOTE — END OF VISIT
[FreeTextEntry3] : I, Halina Martell, acted solely as a scribe for Dr. Coleman on this date 03/13/2020. \par All medical record entries made by the Scribe were at my, Dr. Coleman's  direction and personally dictated by me on 03/13/2020. I have reviewed the chart and agree that the record accurately reflects my personal performance of the history, physical exam, assessment and plan. I have also personally directed, reviewed, and agreed with the chart.

## 2020-05-10 NOTE — HISTORY OF PRESENT ILLNESS
[___ Month(s) Ago] : [unfilled] month(s) ago [Good] : being in good health [Last Pap ___] : Last cervical pap smear was [unfilled] [Reproductive Age] : is of reproductive age [Pregnancy History] : pregnancy history: [Definite:  ___ (Date)] : the last menstrual period was [unfilled] [Menstrual Problems] : reports normal menses [Contraception] : does not use contraception [Sexually Active] : is not sexually active

## 2020-05-10 NOTE — PHYSICAL EXAM
[Awake] : awake [Alert] : alert [Oriented x3] : oriented to person, place, and time [Depressed Mood] : not depressed [Acute Distress] : no acute distress [Flat Affect] : affect not flat

## 2020-05-12 ENCOUNTER — RX CHANGE (OUTPATIENT)
Age: 30
End: 2020-05-12

## 2020-12-21 PROBLEM — Z01.419 ENCOUNTER FOR WELL WOMAN EXAM WITH ROUTINE GYNECOLOGICAL EXAM: Status: RESOLVED | Noted: 2019-08-05 | Resolved: 2020-12-21

## 2022-06-08 ENCOUNTER — APPOINTMENT (OUTPATIENT)
Dept: ANTEPARTUM | Facility: CLINIC | Age: 32
End: 2022-06-08
Payer: COMMERCIAL

## 2022-06-08 ENCOUNTER — ASOB RESULT (OUTPATIENT)
Age: 32
End: 2022-06-08

## 2022-06-08 ENCOUNTER — APPOINTMENT (OUTPATIENT)
Dept: OBGYN | Facility: CLINIC | Age: 32
End: 2022-06-08

## 2022-06-08 VITALS
WEIGHT: 231 LBS | BODY MASS INDEX: 40.93 KG/M2 | DIASTOLIC BLOOD PRESSURE: 68 MMHG | SYSTOLIC BLOOD PRESSURE: 110 MMHG | HEIGHT: 63 IN

## 2022-06-08 PROCEDURE — 36415 COLL VENOUS BLD VENIPUNCTURE: CPT

## 2022-06-08 PROCEDURE — 99395 PREV VISIT EST AGE 18-39: CPT

## 2022-06-08 PROCEDURE — 76817 TRANSVAGINAL US OBSTETRIC: CPT

## 2022-06-08 RX ORDER — NYSTATIN 100000 [USP'U]/G
100000 POWDER TOPICAL
Qty: 1 | Refills: 0 | Status: DISCONTINUED | COMMUNITY
Start: 2020-05-12 | End: 2022-06-08

## 2022-06-08 RX ORDER — NYSTATIN 100000 [USP'U]/G
100000 POWDER TOPICAL
Qty: 1 | Refills: 0 | Status: DISCONTINUED | COMMUNITY
Start: 2020-05-06 | End: 2022-06-08

## 2022-06-22 ENCOUNTER — APPOINTMENT (OUTPATIENT)
Dept: OBGYN | Facility: CLINIC | Age: 32
End: 2022-06-22
Payer: COMMERCIAL

## 2022-06-22 ENCOUNTER — ASOB RESULT (OUTPATIENT)
Age: 32
End: 2022-06-22

## 2022-06-22 ENCOUNTER — APPOINTMENT (OUTPATIENT)
Dept: ANTEPARTUM | Facility: CLINIC | Age: 32
End: 2022-06-22
Payer: COMMERCIAL

## 2022-06-22 VITALS
HEIGHT: 63 IN | SYSTOLIC BLOOD PRESSURE: 110 MMHG | BODY MASS INDEX: 39.87 KG/M2 | WEIGHT: 225 LBS | DIASTOLIC BLOOD PRESSURE: 72 MMHG

## 2022-06-22 LAB
ABO + RH PNL BLD: NORMAL
BASOPHILS # BLD AUTO: 0.03 K/UL
BASOPHILS NFR BLD AUTO: 0.4 %
BILIRUB UR QL STRIP: NORMAL
BLD GP AB SCN SERPL QL: NORMAL
CYTOLOGY CVX/VAG DOC THIN PREP: ABNORMAL
EOSINOPHIL # BLD AUTO: 0.19 K/UL
EOSINOPHIL NFR BLD AUTO: 2.2 %
ESTIMATED AVERAGE GLUCOSE: 120 MG/DL
GLUCOSE UR-MCNC: NORMAL
HBA1C MFR BLD HPLC: 5.8 %
HCG SERPL-MCNC: ABNORMAL MIU/ML
HCG UR QL: 0.2 EU/DL
HCG UR QL: POSITIVE
HCT VFR BLD CALC: 42.5 %
HGB BLD-MCNC: 12.8 G/DL
HGB UR QL STRIP.AUTO: ABNORMAL
HPV HIGH+LOW RISK DNA PNL CVX: NOT DETECTED
IMM GRANULOCYTES NFR BLD AUTO: 0.4 %
KETONES UR-MCNC: 40
LEUKOCYTE ESTERASE UR QL STRIP: ABNORMAL
LYMPHOCYTES # BLD AUTO: 1.57 K/UL
LYMPHOCYTES NFR BLD AUTO: 18.4 %
MAN DIFF?: NORMAL
MCHC RBC-ENTMCNC: 27.1 PG
MCHC RBC-ENTMCNC: 30.1 GM/DL
MCV RBC AUTO: 89.9 FL
MONOCYTES # BLD AUTO: 0.6 K/UL
MONOCYTES NFR BLD AUTO: 7.1 %
NEUTROPHILS # BLD AUTO: 6.09 K/UL
NEUTROPHILS NFR BLD AUTO: 71.5 %
NITRITE UR QL STRIP: NORMAL
PH UR STRIP: 7
PLATELET # BLD AUTO: 323 K/UL
PROGEST SERPL-MCNC: 11.9 NG/ML
PROT UR STRIP-MCNC: 30
QUALITY CONTROL: YES
RBC # BLD: 4.73 M/UL
RBC # FLD: 15.4 %
SP GR UR STRIP: 1.01
WBC # FLD AUTO: 8.51 K/UL

## 2022-06-22 PROCEDURE — 76817 TRANSVAGINAL US OBSTETRIC: CPT

## 2022-06-22 PROCEDURE — 36415 COLL VENOUS BLD VENIPUNCTURE: CPT

## 2022-06-22 PROCEDURE — 0500F INITIAL PRENATAL CARE VISIT: CPT

## 2022-06-24 ENCOUNTER — RX CHANGE (OUTPATIENT)
Age: 32
End: 2022-06-24

## 2022-06-29 ENCOUNTER — APPOINTMENT (OUTPATIENT)
Dept: ANTEPARTUM | Facility: CLINIC | Age: 32
End: 2022-06-29

## 2022-06-29 ENCOUNTER — APPOINTMENT (OUTPATIENT)
Dept: OBGYN | Facility: CLINIC | Age: 32
End: 2022-06-29

## 2022-07-11 ENCOUNTER — NON-APPOINTMENT (OUTPATIENT)
Age: 32
End: 2022-07-11

## 2022-07-13 ENCOUNTER — APPOINTMENT (OUTPATIENT)
Dept: ANTEPARTUM | Facility: CLINIC | Age: 32
End: 2022-07-13

## 2022-07-13 ENCOUNTER — APPOINTMENT (OUTPATIENT)
Dept: OBGYN | Facility: CLINIC | Age: 32
End: 2022-07-13

## 2022-07-13 VITALS
SYSTOLIC BLOOD PRESSURE: 112 MMHG | DIASTOLIC BLOOD PRESSURE: 70 MMHG | HEIGHT: 63 IN | BODY MASS INDEX: 40.09 KG/M2 | WEIGHT: 226.25 LBS

## 2022-07-13 LAB
BASOPHILS # BLD AUTO: 0.05 K/UL
BASOPHILS NFR BLD AUTO: 0.4 %
BILIRUB UR QL STRIP: NORMAL
CMV IGG SERPL QL: <0.2 U/ML
CMV IGG SERPL-IMP: NEGATIVE
CMV IGM SERPL QL: <8 AU/ML
CMV IGM SERPL QL: NEGATIVE
EOSINOPHIL # BLD AUTO: 0.13 K/UL
EOSINOPHIL NFR BLD AUTO: 1 %
GLUCOSE UR-MCNC: NORMAL
HBV SURFACE AG SER QL: NONREACTIVE
HCG UR QL: 0.2 EU/DL
HCT VFR BLD CALC: 43.5 %
HGB A MFR BLD: 97.4 %
HGB A2 MFR BLD: 2.6 %
HGB BLD-MCNC: 13.1 G/DL
HGB FRACT BLD-IMP: NORMAL
HGB UR QL STRIP.AUTO: NORMAL
HIV1+2 AB SPEC QL IA.RAPID: NONREACTIVE
IMM GRANULOCYTES NFR BLD AUTO: 0.3 %
KETONES UR-MCNC: NORMAL
LEUKOCYTE ESTERASE UR QL STRIP: NORMAL
LYMPHOCYTES # BLD AUTO: 1.83 K/UL
LYMPHOCYTES NFR BLD AUTO: 14.5 %
MAN DIFF?: NORMAL
MCHC RBC-ENTMCNC: 26.7 PG
MCHC RBC-ENTMCNC: 30.1 GM/DL
MCV RBC AUTO: 88.6 FL
MEV IGG FLD QL IA: 97.4 AU/ML
MEV IGG+IGM SER-IMP: POSITIVE
MONOCYTES # BLD AUTO: 1.02 K/UL
MONOCYTES NFR BLD AUTO: 8.1 %
NEUTROPHILS # BLD AUTO: 9.59 K/UL
NEUTROPHILS NFR BLD AUTO: 75.7 %
NITRITE UR QL STRIP: NORMAL
PH UR STRIP: 7
PLATELET # BLD AUTO: 332 K/UL
PROGEST SERPL-MCNC: 13.1 NG/ML
PROT UR STRIP-MCNC: NORMAL
RBC # BLD: 4.91 M/UL
RBC # FLD: 14.7 %
RUBV IGG FLD-ACNC: 1.7 INDEX
RUBV IGG SER-IMP: POSITIVE
SP GR UR STRIP: 1.01
T GONDII AB SER-IMP: NEGATIVE
T GONDII IGM SER QL: <3 AU/ML
T PALLIDUM AB SER QL IA: NEGATIVE
T3 SERPL-MCNC: 139 NG/DL
T3FREE SERPL-MCNC: 2.77 PG/ML
T4 FREE SERPL-MCNC: 1.3 NG/DL
T4 SERPL-MCNC: 12.1 UG/DL
TSH SERPL-ACNC: 3.49 UIU/ML
WBC # FLD AUTO: 12.66 K/UL

## 2022-07-17 LAB
AR GENE MUT ANL BLD/T: NORMAL
B19V IGG SER QL IA: 7.67 INDEX
B19V IGG+IGM SER-IMP: NORMAL
B19V IGG+IGM SER-IMP: POSITIVE
B19V IGM FLD-ACNC: 0.14 INDEX
B19V IGM SER-ACNC: NEGATIVE
CFTR MUT TESTED BLD/T: NEGATIVE
FMR1 GENE MUT ANL BLD/T: NORMAL

## 2022-07-17 NOTE — REVIEW OF SYSTEMS
[Patient Intake Form Reviewed] : Patient intake form was reviewed [Negative] : Heme/Lymph [FreeTextEntry8] : amenorrhea

## 2022-07-17 NOTE — PHYSICAL EXAM
[Chaperone Present] : A chaperone was present in the examining room during all aspects of the physical examination [FreeTextEntry1] : Rylie Antunez MA [Appropriately responsive] : appropriately responsive [Alert] : alert [No Acute Distress] : no acute distress [Soft] : soft [Non-tender] : non-tender [Oriented x3] : oriented x3 [Examination Of The Breasts] : a normal appearance [No Masses] : no breast masses were palpable [Labia Majora] : normal [Labia Minora] : normal [Normal] : normal [Uterine Adnexae] : normal

## 2022-07-17 NOTE — HISTORY OF PRESENT ILLNESS
[N] : Patient does not use contraception [Y] : Positive pregnancy history [Menarche Age: ____] : age at menarche was [unfilled] [Currently Active] : currently active [PapSmeardate] : 8/05/19 [TextBox_31] : neg [HIVDate] : 2/07/20 [TextBox_53] : neg [GonorrheaDate] : 8/05/19 [TextBox_63] : neg [ChlamydiaDate] : 8/05/19 [TextBox_68] : neg [HepatitisBDate] : 8/09/19 [TextBox_83] : neg [LMPDate] : 5/02/22 [PGHxTotal] : 2 [[facility] ClinicxFulerm] : 1 [[facility] Centeriving] : 1 [FreeTextEntry1] : 5/02/22

## 2022-07-17 NOTE — DISCUSSION/SUMMARY
[FreeTextEntry1] : During this visit comprehensive counseling was given regarding the following concerns:\par \par 1 We discussed the need for proper nutrition, hydration and exercise.  This includes cardiovascular and pelvic floor exercise.\par \par 2 We discussed the importance of maintaining a proper vaccination schedule and we discussed the utility of the flu vaccine and TDaP, Shingles and Covid 19 with periodic boosters. \par \par 3 We discussed the impact of chronic sun exposure and the risk for skin disease as a result. The benefits of a total body scan by a Dermatologist was reviewed..\par \par 4 We discussed the need for certain supplements for most people which include vitamin D3, calcium rich foods with limitation on calcium supplementation by tabular form to 600 mg  daily.  As part of a bone health program we recommend weightbearing exercises, and some limited sun exposure ( 10-15 minutes daily) to help convert vitamin D to its active form.\par 5) We discussed the importance of an eye exam with dilation and retinal and corneal examination. \par \par We discussed the physiology of pregnancy, the nutritional needs and restrictions and the need to confirm location and viability.

## 2022-07-21 ENCOUNTER — NON-APPOINTMENT (OUTPATIENT)
Age: 32
End: 2022-07-21

## 2022-07-27 ENCOUNTER — APPOINTMENT (OUTPATIENT)
Dept: ANTEPARTUM | Facility: CLINIC | Age: 32
End: 2022-07-27

## 2022-07-27 ENCOUNTER — ASOB RESULT (OUTPATIENT)
Age: 32
End: 2022-07-27

## 2022-07-31 LAB
CLARI TEST COMMENT: NORMAL
CLARIM 22Q11.2: NORMAL
CLARIM ADDITIONAL INFO: NORMAL
CLARIM CHROMOSOME 13: NORMAL
CLARIM CHROMOSOME 18: NORMAL
CLARIM CHROMOSOME 21: NORMAL
CLARIM EDD: NORMAL
CLARIM SEX CHROMOSOMES: NORMAL
CLARITEST NIPT W/MICRO: NORMAL
FETAL FRACT: NORMAL
GESTATION AGE: NORMAL
MATERNAL WEIGHT (LBS):: 226
PLEASE INCLUDE GENDER RESULTS ON THIS REPORT:: NORMAL
TYPE OF PREGNANCY:: NORMAL

## 2022-08-02 ENCOUNTER — NON-APPOINTMENT (OUTPATIENT)
Age: 32
End: 2022-08-02

## 2022-08-04 ENCOUNTER — NON-APPOINTMENT (OUTPATIENT)
Age: 32
End: 2022-08-04

## 2022-08-10 ENCOUNTER — APPOINTMENT (OUTPATIENT)
Dept: OBGYN | Facility: CLINIC | Age: 32
End: 2022-08-10

## 2022-08-10 VITALS
BODY MASS INDEX: 39.34 KG/M2 | HEIGHT: 63 IN | DIASTOLIC BLOOD PRESSURE: 72 MMHG | WEIGHT: 222 LBS | SYSTOLIC BLOOD PRESSURE: 124 MMHG

## 2022-08-10 LAB
BILIRUB UR QL STRIP: NORMAL
GLUCOSE UR-MCNC: NORMAL
HCG UR QL: 0.2 EU/DL
HGB UR QL STRIP.AUTO: NORMAL
KETONES UR-MCNC: ABNORMAL
LEUKOCYTE ESTERASE UR QL STRIP: ABNORMAL
NITRITE UR QL STRIP: NORMAL
PH UR STRIP: 7.5
PROT UR STRIP-MCNC: 30
SP GR UR STRIP: 1.02

## 2022-08-10 PROCEDURE — 0502F SUBSEQUENT PRENATAL CARE: CPT

## 2022-08-11 ENCOUNTER — NON-APPOINTMENT (OUTPATIENT)
Age: 32
End: 2022-08-11

## 2022-09-06 ENCOUNTER — APPOINTMENT (OUTPATIENT)
Dept: OBGYN | Facility: CLINIC | Age: 32
End: 2022-09-06

## 2022-09-06 VITALS
WEIGHT: 229.31 LBS | HEIGHT: 63 IN | DIASTOLIC BLOOD PRESSURE: 68 MMHG | SYSTOLIC BLOOD PRESSURE: 118 MMHG | BODY MASS INDEX: 40.63 KG/M2

## 2022-09-06 LAB
AFP PNL SERPL: NORMAL
BILIRUB UR QL STRIP: NORMAL
GLUCOSE UR-MCNC: NORMAL
HCG UR QL: 0.2 EU/DL
HGB UR QL STRIP.AUTO: NORMAL
KETONES UR-MCNC: NORMAL
LEUKOCYTE ESTERASE UR QL STRIP: NORMAL
NITRITE UR QL STRIP: NORMAL
PH UR STRIP: 6
PROT UR STRIP-MCNC: NORMAL
SP GR UR STRIP: 1.02

## 2022-09-06 PROCEDURE — 0502F SUBSEQUENT PRENATAL CARE: CPT

## 2022-09-20 ENCOUNTER — ASOB RESULT (OUTPATIENT)
Age: 32
End: 2022-09-20

## 2022-09-20 ENCOUNTER — APPOINTMENT (OUTPATIENT)
Dept: ANTEPARTUM | Facility: CLINIC | Age: 32
End: 2022-09-20

## 2022-09-20 PROCEDURE — 76811 OB US DETAILED SNGL FETUS: CPT

## 2022-09-20 PROCEDURE — 76817 TRANSVAGINAL US OBSTETRIC: CPT

## 2022-10-04 ENCOUNTER — APPOINTMENT (OUTPATIENT)
Dept: OBGYN | Facility: CLINIC | Age: 32
End: 2022-10-04

## 2022-10-04 VITALS
WEIGHT: 233 LBS | BODY MASS INDEX: 41.29 KG/M2 | SYSTOLIC BLOOD PRESSURE: 142 MMHG | HEIGHT: 63 IN | DIASTOLIC BLOOD PRESSURE: 70 MMHG

## 2022-10-04 LAB
BILIRUB UR QL STRIP: NEGATIVE
GLUCOSE UR-MCNC: NEGATIVE
HCG UR QL: 0.2 EU/DL
HGB UR QL STRIP.AUTO: NEGATIVE
KETONES UR-MCNC: ABNORMAL
LEUKOCYTE ESTERASE UR QL STRIP: NEGATIVE
NITRITE UR QL STRIP: NEGATIVE
PH UR STRIP: 6
PROT UR STRIP-MCNC: 30
SP GR UR STRIP: 1.03

## 2022-10-04 PROCEDURE — 0502F SUBSEQUENT PRENATAL CARE: CPT

## 2022-10-12 ENCOUNTER — APPOINTMENT (OUTPATIENT)
Dept: CARDIOLOGY | Facility: CLINIC | Age: 32
End: 2022-10-12

## 2022-10-12 VITALS
BODY MASS INDEX: 41.29 KG/M2 | SYSTOLIC BLOOD PRESSURE: 130 MMHG | RESPIRATION RATE: 16 BRPM | HEIGHT: 63 IN | DIASTOLIC BLOOD PRESSURE: 62 MMHG | WEIGHT: 233 LBS | HEART RATE: 89 BPM

## 2022-10-12 PROCEDURE — 93000 ELECTROCARDIOGRAM COMPLETE: CPT

## 2022-10-12 PROCEDURE — 99214 OFFICE O/P EST MOD 30 MIN: CPT | Mod: 25

## 2022-10-14 NOTE — REASON FOR VISIT
[FreeTextEntry1] : Mrs. Harris is a pleasant 32-year-old white female with a past medical history significant for asthma, currently 23-weeks pregnant, who presents for evaluation of palpitations. \par

## 2022-10-14 NOTE — HISTORY OF PRESENT ILLNESS
[FreeTextEntry1] : \par Mrs. Harris states that over the last 2-3 weeks, she has developed intermittent palpitations which do not appear to have an exertional component and can occur at any time as well as at rest. Palpitations do not appear to awaken her from sleep.  There is no associated chest pain, shortness of breath, lightheadedness, or actual syncope.  She states she had similar palpitations when pregnant with her first child.

## 2022-10-14 NOTE — PHYSICAL EXAM
[General Appearance - Well Developed] : well developed [General Appearance - Well Nourished] : well nourished [General Appearance - In No Acute Distress] : no acute distress [Normal Conjunctiva] : the conjunctiva exhibited no abnormalities [Normal Oropharynx] : normal oropharynx [Normal Jugular Venous V Waves Present] : normal jugular venous V waves present [Heart Rate And Rhythm] : heart rate and rhythm were normal [Heart Sounds] : normal S1 and S2 [Murmurs] : no murmurs present [Auscultation Breath Sounds / Voice Sounds] : lungs were clear to auscultation bilaterally [Bowel Sounds] : normal bowel sounds [Abnormal Walk] : normal gait [Skin Color & Pigmentation] : normal skin color and pigmentation [Skin Turgor] : normal skin turgor [Oriented To Time, Place, And Person] : oriented to person, place, and time [Affect] : the affect was normal [Mood] : the mood was normal [FreeTextEntry1] : no edema

## 2022-10-14 NOTE — ASSESSMENT
[FreeTextEntry1] : 1.  EKG today reveals sinus rhythm at 89 bpm.  Normal intervals.  Poor R-wave progression leads V1 through V3.  No acute ischemic changes. \par \par 2.  Palpitations:  Patient with daily palpitations which do not appear to be associated with other cardiac symptoms similar to those experienced with her first pregnancy.  I have advised her to undergo both 24-hour Holter monitor as well as a baseline echocardiogram for further evaluation.  She states that she is currently on minimal amounts of caffeine and have advised her to minimize as best as possible.  Good hydration recommended as well.  Follow up office visit after testing is performed.    \par

## 2022-10-19 ENCOUNTER — APPOINTMENT (OUTPATIENT)
Dept: OBGYN | Facility: CLINIC | Age: 32
End: 2022-10-19

## 2022-10-19 VITALS
BODY MASS INDEX: 41.82 KG/M2 | WEIGHT: 236 LBS | SYSTOLIC BLOOD PRESSURE: 122 MMHG | DIASTOLIC BLOOD PRESSURE: 64 MMHG | HEIGHT: 63 IN

## 2022-10-19 LAB
ALBUMIN SERPL ELPH-MCNC: 3.7 G/DL
ALP BLD-CCNC: 70 U/L
ALT SERPL-CCNC: 9 U/L
ANION GAP SERPL CALC-SCNC: 10 MMOL/L
AST SERPL-CCNC: 10 U/L
BASOPHILS # BLD AUTO: 0.04 K/UL
BASOPHILS NFR BLD AUTO: 0.4 %
BILIRUB SERPL-MCNC: 0.2 MG/DL
BILIRUB UR QL STRIP: NORMAL
BUN SERPL-MCNC: 6 MG/DL
CALCIUM SERPL-MCNC: 9.4 MG/DL
CHLORIDE SERPL-SCNC: 103 MMOL/L
CO2 SERPL-SCNC: 23 MMOL/L
CREAT 24H UR-MCNC: 1.8 G/24 H
CREAT ?TM UR-MCNC: 76 MG/DL
CREAT SERPL-MCNC: 0.61 MG/DL
CREAT SPEC-SCNC: 227 MG/DL
CREAT/PROT UR: 0.1 RATIO
EGFR: 122 ML/MIN/1.73M2
EOSINOPHIL # BLD AUTO: 0.16 K/UL
EOSINOPHIL NFR BLD AUTO: 1.5 %
FIBRINOGEN PPP COAG.DERIVED-MCNC: 872 MG/DL
GLUCOSE SERPL-MCNC: 86 MG/DL
GLUCOSE UR-MCNC: NORMAL
HCG UR QL: 0.2 EU/DL
HCT VFR BLD CALC: 36.6 %
HGB BLD-MCNC: 11.4 G/DL
HGB UR QL STRIP.AUTO: NORMAL
IMM GRANULOCYTES NFR BLD AUTO: 0.3 %
INR PPP: 0.93 RATIO
KETONES UR-MCNC: NORMAL
LDH SERPL-CCNC: 137 U/L
LEUKOCYTE ESTERASE UR QL STRIP: NORMAL
LYMPHOCYTES # BLD AUTO: 1.99 K/UL
LYMPHOCYTES NFR BLD AUTO: 18.5 %
MAN DIFF?: NORMAL
MCHC RBC-ENTMCNC: 28.4 PG
MCHC RBC-ENTMCNC: 31.1 GM/DL
MCV RBC AUTO: 91 FL
MONOCYTES # BLD AUTO: 0.69 K/UL
MONOCYTES NFR BLD AUTO: 6.4 %
NEUTROPHILS # BLD AUTO: 7.87 K/UL
NEUTROPHILS NFR BLD AUTO: 72.9 %
NITRITE UR QL STRIP: NORMAL
PH UR STRIP: 7
PLATELET # BLD AUTO: 289 K/UL
POTASSIUM SERPL-SCNC: 4.1 MMOL/L
PROT ?TM UR-MCNC: 24 HR
PROT SERPL-MCNC: 6.9 G/DL
PROT UR STRIP-MCNC: NORMAL
PROT UR-MCNC: 21 MG/DL
PT BLD: 10.9 SEC
RBC # BLD: 4.02 M/UL
RBC # FLD: 14.6 %
SODIUM SERPL-SCNC: 137 MMOL/L
SP GR UR STRIP: 1.02
SPECIMEN VOL 24H UR: 2350 ML
URATE SERPL-MCNC: 3.2 MG/DL
WBC # FLD AUTO: 10.78 K/UL

## 2022-10-19 PROCEDURE — 0502F SUBSEQUENT PRENATAL CARE: CPT

## 2022-10-20 ENCOUNTER — NON-APPOINTMENT (OUTPATIENT)
Age: 32
End: 2022-10-20

## 2022-11-02 ENCOUNTER — APPOINTMENT (OUTPATIENT)
Dept: CARDIOLOGY | Facility: CLINIC | Age: 32
End: 2022-11-02

## 2022-11-09 ENCOUNTER — APPOINTMENT (OUTPATIENT)
Dept: OBGYN | Facility: CLINIC | Age: 32
End: 2022-11-09
Payer: COMMERCIAL

## 2022-11-09 VITALS
WEIGHT: 237 LBS | BODY MASS INDEX: 41.99 KG/M2 | HEIGHT: 63 IN | DIASTOLIC BLOOD PRESSURE: 66 MMHG | SYSTOLIC BLOOD PRESSURE: 118 MMHG

## 2022-11-09 LAB
BILIRUB UR QL STRIP: NORMAL
COLLECTION METHOD: NORMAL
CREAT 24H UR-MCNC: 1.8 G/24 H
CREAT ?TM UR-MCNC: 76 MG/DL
GLUCOSE UR-MCNC: NORMAL
HCG UR QL: 1 EU/DL
HGB UR QL STRIP.AUTO: NORMAL
KETONES UR-MCNC: ABNORMAL
LEUKOCYTE ESTERASE UR QL STRIP: ABNORMAL
NITRITE UR QL STRIP: NORMAL
PH UR STRIP: 7
PROT 24H UR-MRATE: 6 MG/DL
PROT ?TM UR-MCNC: 24 HR
PROT UR STRIP-MCNC: NORMAL
PROT UR-MCNC: 141 MG/24 H
SP GR UR STRIP: 1.02
SPECIMEN VOL 24H UR: 2350 ML

## 2022-11-09 PROCEDURE — 90471 IMMUNIZATION ADMIN: CPT

## 2022-11-09 PROCEDURE — 0502F SUBSEQUENT PRENATAL CARE: CPT

## 2022-11-09 PROCEDURE — 90686 IIV4 VACC NO PRSV 0.5 ML IM: CPT

## 2022-11-10 ENCOUNTER — NON-APPOINTMENT (OUTPATIENT)
Age: 32
End: 2022-11-10

## 2022-11-21 ENCOUNTER — APPOINTMENT (OUTPATIENT)
Dept: CARDIOLOGY | Facility: CLINIC | Age: 32
End: 2022-11-21

## 2022-11-21 PROCEDURE — 93306 TTE W/DOPPLER COMPLETE: CPT

## 2022-11-28 ENCOUNTER — APPOINTMENT (OUTPATIENT)
Dept: CARDIOLOGY | Facility: CLINIC | Age: 32
End: 2022-11-28

## 2022-11-28 ENCOUNTER — NON-APPOINTMENT (OUTPATIENT)
Age: 32
End: 2022-11-28

## 2022-11-28 VITALS
HEIGHT: 63 IN | BODY MASS INDEX: 42.35 KG/M2 | DIASTOLIC BLOOD PRESSURE: 66 MMHG | HEART RATE: 101 BPM | RESPIRATION RATE: 16 BRPM | SYSTOLIC BLOOD PRESSURE: 120 MMHG | WEIGHT: 239 LBS

## 2022-11-28 DIAGNOSIS — R00.2 PALPITATIONS: ICD-10-CM

## 2022-11-28 PROCEDURE — 93000 ELECTROCARDIOGRAM COMPLETE: CPT

## 2022-11-28 PROCEDURE — 99214 OFFICE O/P EST MOD 30 MIN: CPT | Mod: 25

## 2022-11-28 NOTE — CARDIOLOGY SUMMARY
[de-identified] : Sinus tachycardia at 101 bpm.  Poor R-wave progression in V1-V3.  No acute ischemic changes.

## 2022-11-28 NOTE — HISTORY OF PRESENT ILLNESS
[FreeTextEntry1] : Mrs. Harris presents today with complaints of random "quick, sharp twinge" in her chest not related to exertion as well as occasional short-lived palpitations.  States that she had similar symptoms with her first pregnancy.  Drinks a cup of tea in the morning and one in the evening.  Tries to hydrate as well as she can.  Has not been sleeping very well as she cannot get comfortable.

## 2022-11-28 NOTE — PHYSICAL EXAM
[Well Developed] : well developed [Well Nourished] : well nourished [No Acute Distress] : no acute distress [Normal Conjunctiva] : normal conjunctiva [Normal Venous Pressure] : normal venous pressure [No Carotid Bruit] : no carotid bruit [Normal S1, S2] : normal S1, S2 [No Murmur] : no murmur [No Rub] : no rub [No Gallop] : no gallop [Clear Lung Fields] : clear lung fields [No Respiratory Distress] : no respiratory distress  [Normal Bowel Sounds] : normal bowel sounds [Normal Gait] : normal gait [No Edema] : no edema [No Rash] : no rash [No Skin Lesions] : no skin lesions [Moves all extremities] : moves all extremities [No Focal Deficits] : no focal deficits [Normal Speech] : normal speech [Alert and Oriented] : alert and oriented [Normal memory] : normal memory [de-identified] : Gravid uterus

## 2022-11-28 NOTE — REASON FOR VISIT
[FreeTextEntry1] : Mrs. Harris is a pleasant 32-year-old white female with a past medical history significant for asthma, currently 30-weeks pregnant, who presents for evaluation of palpitations. \par

## 2022-11-28 NOTE — DISCUSSION/SUMMARY
[FreeTextEntry1] : 1 - Palpitations:  presents today with complaints of random "quick, sharp twinge" in her chest not related to exertion as well as occasional short-lived palpitations.  States that she had similar symptoms with her first pregnancy.  Drinks a cup of tea in the morning and one in the evening.  Tries to hydrate as well as she can.  Has not been sleeping very well as she cannot get comfortable.\par \par 24-hour holter monitor:  baseline rhythm is sinus with an average heart rate of 88 bpm (max 123, min 67).  no VT or SVTs were observed.  There were 2 isolated multifocal PVCs recorded.  No PACs were recorded.  No significant pauses or AV block were observed.  There were 6 patient events recorded correlation with sinus rhythm and sinus tachycardia with some artifact noise.\par \par Echocardiogram (11/21/2022):  EF 60-65%.  The left and right atria are normal in size and structure.  Normal right ventricular size and systolic function.  Abnormal inferior vena cave.  There is no evidence of pericardial effusion.\par \par Patient reassured.  Advised to keep caffeine intake to a minimum, hydrate well and try to get adequate sleep.\par \par 2 - Follow up PRN. [EKG obtained to assist in diagnosis and management of assessed problem(s)] : EKG obtained to assist in diagnosis and management of assessed problem(s)

## 2022-11-29 ENCOUNTER — NON-APPOINTMENT (OUTPATIENT)
Age: 32
End: 2022-11-29

## 2022-11-30 ENCOUNTER — ASOB RESULT (OUTPATIENT)
Age: 32
End: 2022-11-30

## 2022-11-30 ENCOUNTER — APPOINTMENT (OUTPATIENT)
Dept: OBGYN | Facility: CLINIC | Age: 32
End: 2022-11-30

## 2022-11-30 ENCOUNTER — APPOINTMENT (OUTPATIENT)
Dept: ANTEPARTUM | Facility: CLINIC | Age: 32
End: 2022-11-30

## 2022-11-30 VITALS
BODY MASS INDEX: 42.88 KG/M2 | SYSTOLIC BLOOD PRESSURE: 119 MMHG | DIASTOLIC BLOOD PRESSURE: 84 MMHG | WEIGHT: 242 LBS | HEIGHT: 63 IN

## 2022-11-30 LAB
BASOPHILS # BLD AUTO: 0.03 K/UL
BASOPHILS NFR BLD AUTO: 0.3 %
BILIRUB UR QL STRIP: NORMAL
COLLECTION METHOD: NORMAL
EOSINOPHIL # BLD AUTO: 0.11 K/UL
EOSINOPHIL NFR BLD AUTO: 1.3 %
GLUCOSE 1H P 50 G GLC PO SERPL-MCNC: 127 MG/DL
GLUCOSE UR-MCNC: NORMAL
HCG UR QL: 0.2 EU/DL
HCT VFR BLD CALC: 35.2 %
HGB BLD-MCNC: 11.1 G/DL
HGB UR QL STRIP.AUTO: NORMAL
IMM GRANULOCYTES NFR BLD AUTO: 0.2 %
KETONES UR-MCNC: NORMAL
LEUKOCYTE ESTERASE UR QL STRIP: NORMAL
LYMPHOCYTES # BLD AUTO: 1.24 K/UL
LYMPHOCYTES NFR BLD AUTO: 14.3 %
MAN DIFF?: NORMAL
MCHC RBC-ENTMCNC: 28.1 PG
MCHC RBC-ENTMCNC: 31.5 GM/DL
MCV RBC AUTO: 89.1 FL
MONOCYTES # BLD AUTO: 0.58 K/UL
MONOCYTES NFR BLD AUTO: 6.7 %
NEUTROPHILS # BLD AUTO: 6.7 K/UL
NEUTROPHILS NFR BLD AUTO: 77.2 %
NITRITE UR QL STRIP: NORMAL
PH UR STRIP: 7
PLATELET # BLD AUTO: 255 K/UL
PROT UR STRIP-MCNC: NORMAL
RBC # BLD: 3.95 M/UL
RBC # FLD: 14 %
SP GR UR STRIP: 1.01
WBC # FLD AUTO: 8.68 K/UL

## 2022-11-30 PROCEDURE — 0502F SUBSEQUENT PRENATAL CARE: CPT

## 2022-11-30 PROCEDURE — 76816 OB US FOLLOW-UP PER FETUS: CPT

## 2022-11-30 PROCEDURE — 76817 TRANSVAGINAL US OBSTETRIC: CPT

## 2022-12-05 ENCOUNTER — NON-APPOINTMENT (OUTPATIENT)
Age: 32
End: 2022-12-05

## 2022-12-14 ENCOUNTER — APPOINTMENT (OUTPATIENT)
Dept: OBGYN | Facility: CLINIC | Age: 32
End: 2022-12-14

## 2022-12-14 VITALS
SYSTOLIC BLOOD PRESSURE: 124 MMHG | HEIGHT: 63 IN | BODY MASS INDEX: 43.41 KG/M2 | DIASTOLIC BLOOD PRESSURE: 79 MMHG | WEIGHT: 245 LBS

## 2022-12-14 LAB
BILIRUB UR QL STRIP: NORMAL
GLUCOSE UR-MCNC: NORMAL
HCG UR QL: 0.2 EU/DL
HGB UR QL STRIP.AUTO: NORMAL
KETONES UR-MCNC: NORMAL
LEUKOCYTE ESTERASE UR QL STRIP: NORMAL
NITRITE UR QL STRIP: NORMAL
PH UR STRIP: 7
PROT UR STRIP-MCNC: NORMAL
SP GR UR STRIP: 1.02

## 2022-12-14 PROCEDURE — 0502F SUBSEQUENT PRENATAL CARE: CPT

## 2022-12-16 ENCOUNTER — NON-APPOINTMENT (OUTPATIENT)
Age: 32
End: 2022-12-16

## 2022-12-28 ENCOUNTER — APPOINTMENT (OUTPATIENT)
Dept: OBGYN | Facility: CLINIC | Age: 32
End: 2022-12-28
Payer: COMMERCIAL

## 2022-12-28 VITALS
BODY MASS INDEX: 43.23 KG/M2 | WEIGHT: 244 LBS | DIASTOLIC BLOOD PRESSURE: 77 MMHG | SYSTOLIC BLOOD PRESSURE: 118 MMHG | HEIGHT: 63 IN

## 2022-12-28 LAB
BILIRUB UR QL STRIP: NORMAL
COLLECTION METHOD: NORMAL
GLUCOSE UR-MCNC: NORMAL
HCG UR QL: 0.2 EU/DL
HGB UR QL STRIP.AUTO: NORMAL
KETONES UR-MCNC: NORMAL
LEUKOCYTE ESTERASE UR QL STRIP: NORMAL
NITRITE UR QL STRIP: NORMAL
PH UR STRIP: 7
PROT UR STRIP-MCNC: NORMAL
SP GR UR STRIP: 1.01

## 2022-12-28 PROCEDURE — 90715 TDAP VACCINE 7 YRS/> IM: CPT

## 2022-12-28 PROCEDURE — 0502F SUBSEQUENT PRENATAL CARE: CPT

## 2022-12-28 PROCEDURE — 90471 IMMUNIZATION ADMIN: CPT

## 2023-01-10 ENCOUNTER — NON-APPOINTMENT (OUTPATIENT)
Age: 33
End: 2023-01-10

## 2023-01-11 ENCOUNTER — APPOINTMENT (OUTPATIENT)
Dept: OBGYN | Facility: CLINIC | Age: 33
End: 2023-01-11
Payer: COMMERCIAL

## 2023-01-11 VITALS
BODY MASS INDEX: 43.94 KG/M2 | SYSTOLIC BLOOD PRESSURE: 123 MMHG | HEIGHT: 63 IN | WEIGHT: 248 LBS | DIASTOLIC BLOOD PRESSURE: 82 MMHG

## 2023-01-11 PROCEDURE — XXXXX: CPT

## 2023-01-15 LAB
B-HEM STREP SPEC QL CULT: NORMAL
BILIRUB UR QL STRIP: NORMAL
GLUCOSE UR-MCNC: NORMAL
HCG UR QL: 0.2 EU/DL
HGB UR QL STRIP.AUTO: NORMAL
HIV1+2 AB SPEC QL IA.RAPID: NONREACTIVE
KETONES UR-MCNC: NORMAL
LEUKOCYTE ESTERASE UR QL STRIP: ABNORMAL
NITRITE UR QL STRIP: NORMAL
PH UR STRIP: 7.5
PROT UR STRIP-MCNC: NORMAL
SP GR UR STRIP: 1.02

## 2023-01-17 ENCOUNTER — NON-APPOINTMENT (OUTPATIENT)
Age: 33
End: 2023-01-17

## 2023-01-18 ENCOUNTER — ASOB RESULT (OUTPATIENT)
Age: 33
End: 2023-01-18

## 2023-01-18 ENCOUNTER — APPOINTMENT (OUTPATIENT)
Dept: ANTEPARTUM | Facility: CLINIC | Age: 33
End: 2023-01-18
Payer: COMMERCIAL

## 2023-01-18 ENCOUNTER — APPOINTMENT (OUTPATIENT)
Dept: OBGYN | Facility: CLINIC | Age: 33
End: 2023-01-18
Payer: COMMERCIAL

## 2023-01-18 VITALS
WEIGHT: 249.31 LBS | HEIGHT: 63 IN | SYSTOLIC BLOOD PRESSURE: 124 MMHG | DIASTOLIC BLOOD PRESSURE: 84 MMHG | BODY MASS INDEX: 44.18 KG/M2

## 2023-01-18 LAB
BILIRUB UR QL STRIP: NORMAL
COLLECTION METHOD: NORMAL
GLUCOSE UR-MCNC: NORMAL
HCG UR QL: 0.2 EU/DL
HGB UR QL STRIP.AUTO: NORMAL
KETONES UR-MCNC: ABNORMAL
LEUKOCYTE ESTERASE UR QL STRIP: NORMAL
NITRITE UR QL STRIP: NORMAL
PH UR STRIP: 6.5
PROT UR STRIP-MCNC: NORMAL
SP GR UR STRIP: 1.01

## 2023-01-18 PROCEDURE — 0502F SUBSEQUENT PRENATAL CARE: CPT

## 2023-01-18 PROCEDURE — 76819 FETAL BIOPHYS PROFIL W/O NST: CPT

## 2023-01-18 PROCEDURE — 76816 OB US FOLLOW-UP PER FETUS: CPT

## 2023-01-25 ENCOUNTER — APPOINTMENT (OUTPATIENT)
Dept: OBGYN | Facility: CLINIC | Age: 33
End: 2023-01-25
Payer: COMMERCIAL

## 2023-01-25 ENCOUNTER — NON-APPOINTMENT (OUTPATIENT)
Age: 33
End: 2023-01-25

## 2023-01-25 VITALS — DIASTOLIC BLOOD PRESSURE: 72 MMHG | SYSTOLIC BLOOD PRESSURE: 126 MMHG

## 2023-01-25 VITALS
HEIGHT: 63 IN | WEIGHT: 249.56 LBS | SYSTOLIC BLOOD PRESSURE: 131 MMHG | BODY MASS INDEX: 44.22 KG/M2 | DIASTOLIC BLOOD PRESSURE: 82 MMHG

## 2023-01-25 LAB
BILIRUB UR QL STRIP: NORMAL
COLLECTION METHOD: NORMAL
GLUCOSE UR-MCNC: NORMAL
HCG UR QL: 0.2 EU/DL
HGB UR QL STRIP.AUTO: NORMAL
KETONES UR-MCNC: ABNORMAL
LEUKOCYTE ESTERASE UR QL STRIP: NORMAL
NITRITE UR QL STRIP: NORMAL
PH UR STRIP: 7
PROT UR STRIP-MCNC: NORMAL
SP GR UR STRIP: 1.01

## 2023-01-25 PROCEDURE — 0502F SUBSEQUENT PRENATAL CARE: CPT

## 2023-01-30 ENCOUNTER — NON-APPOINTMENT (OUTPATIENT)
Age: 33
End: 2023-01-30

## 2023-01-30 ENCOUNTER — APPOINTMENT (OUTPATIENT)
Dept: OBGYN | Facility: CLINIC | Age: 33
End: 2023-01-30
Payer: COMMERCIAL

## 2023-01-30 VITALS
BODY MASS INDEX: 43.94 KG/M2 | SYSTOLIC BLOOD PRESSURE: 132 MMHG | WEIGHT: 248 LBS | DIASTOLIC BLOOD PRESSURE: 77 MMHG | HEIGHT: 63 IN

## 2023-01-30 LAB
BILIRUB UR QL STRIP: NORMAL
CLARITY UR: CLEAR
COLLECTION METHOD: NORMAL
GLUCOSE UR-MCNC: NORMAL
HCG UR QL: 0.2 EU/DL
HGB UR QL STRIP.AUTO: NORMAL
KETONES UR-MCNC: ABNORMAL
LEUKOCYTE ESTERASE UR QL STRIP: NORMAL
NITRITE UR QL STRIP: NORMAL
PH UR STRIP: 7
PROT UR STRIP-MCNC: NORMAL
SP GR UR STRIP: 1.01

## 2023-01-30 PROCEDURE — ZZZZZ: CPT

## 2023-02-03 ENCOUNTER — APPOINTMENT (OUTPATIENT)
Dept: OBGYN | Facility: HOSPITAL | Age: 33
End: 2023-02-03

## 2023-02-07 ENCOUNTER — NON-APPOINTMENT (OUTPATIENT)
Age: 33
End: 2023-02-07

## 2023-02-09 ENCOUNTER — APPOINTMENT (OUTPATIENT)
Dept: OBGYN | Facility: CLINIC | Age: 33
End: 2023-02-09
Payer: COMMERCIAL

## 2023-02-09 VITALS
WEIGHT: 233 LBS | BODY MASS INDEX: 41.29 KG/M2 | DIASTOLIC BLOOD PRESSURE: 75 MMHG | HEIGHT: 63 IN | SYSTOLIC BLOOD PRESSURE: 111 MMHG

## 2023-02-09 PROCEDURE — 0503F POSTPARTUM CARE VISIT: CPT

## 2023-02-09 NOTE — HISTORY OF PRESENT ILLNESS
[Postpartum Follow Up] : postpartum follow up [Complications:___] : no complications [Delivery Date: ___] : on [unfilled] [Repeat C/S] : delivered by  section (repeat) [Abdominal Pain] : no abdominal pain [Back Pain] : no back pain [Breast Pain] : no breast pain [Incisional Drainage] : no incisional drainage [Incisional Pain] : no incisional pain [Suicidal Ideation] : no suicidal ideation [Chills] : no chills [Fever] : no fever [Clean/Dry/Intact] : clean, dry and intact [Erythema] : not erythematous [Swelling] : not swollen [Doing Well] : is doing well [Excellent Pain Control] : has excellent pain control [FreeTextEntry1] : - the Prevena wound vac was removed along with the steristrips. The incision is well reapproximated and looks great. steristrips were placed after the wound area was cleansed with alcohol. \par -postop instructions were given, DVT precautions (ambulate frequently, stay well hydrated. \par \par -RTO in 3 weeks\par -no s/s of PP depression

## 2023-03-07 ENCOUNTER — APPOINTMENT (OUTPATIENT)
Dept: OBGYN | Facility: CLINIC | Age: 33
End: 2023-03-07
Payer: COMMERCIAL

## 2023-03-07 VITALS
DIASTOLIC BLOOD PRESSURE: 85 MMHG | HEIGHT: 63 IN | SYSTOLIC BLOOD PRESSURE: 126 MMHG | BODY MASS INDEX: 39.39 KG/M2 | WEIGHT: 222.31 LBS

## 2023-03-07 PROCEDURE — 99213 OFFICE O/P EST LOW 20 MIN: CPT

## 2023-03-07 NOTE — PHYSICAL EXAM
[Chaperone Present] : A chaperone was present in the examining room during all aspects of the physical examination [FreeTextEntry1] : Paulina Dietz, [facility] [Appropriately responsive] : appropriately responsive [Alert] : alert [No Acute Distress] : no acute distress [No Lymphadenopathy] : no lymphadenopathy [Clear to Auscultation B/L] : clear to auscultation bilaterally [Soft] : soft [Non-tender] : non-tender [Non-distended] : non-distended [No Mass] : no mass [Oriented x3] : oriented x3 [FreeTextEntry7] : small puntate suture rejection, mid incision, not infected, bacitracin applied.

## 2023-03-07 NOTE — DISCUSSION/SUMMARY
[FreeTextEntry1] : -Patient is doing well post-op. C/S incision site: scant discharge. Healing well. Wound care reviewed. No signs of depression.\par \par -Contraceptive options were discussed.\par \par -Patient is breastfeeding \par \par -She will return in 1 year or as needed \par \par - All questions and concerns were addressed at today's visit.

## 2023-03-12 ENCOUNTER — NON-APPOINTMENT (OUTPATIENT)
Age: 33
End: 2023-03-12

## 2023-03-13 NOTE — PROGRESS NOTE ADULT - PROVIDER SPECIALTY LIST ADULT
OB [Chaperone Present] : A chaperone was present in the examining room during all aspects of the physical examination [Appropriately responsive] : appropriately responsive [Alert] : alert [Soft] : soft [Non-tender] : non-tender [Non-distended] : non-distended [Oriented x3] : oriented x3 [Examination Of The Breasts] : a normal appearance [No Masses] : no breast masses were palpable [Labia Majora] : normal [Labia Minora] : normal [Normal] : normal [Absent] : absent [Uterine Adnexae] : absent [No Tenderness] : no tenderness

## 2023-05-18 ENCOUNTER — APPOINTMENT (OUTPATIENT)
Dept: DERMATOLOGY | Facility: CLINIC | Age: 33
End: 2023-05-18
Payer: COMMERCIAL

## 2023-05-18 PROCEDURE — 99204 OFFICE O/P NEW MOD 45 MIN: CPT

## 2023-06-10 ENCOUNTER — NON-APPOINTMENT (OUTPATIENT)
Age: 33
End: 2023-06-10

## 2023-06-12 ENCOUNTER — NON-APPOINTMENT (OUTPATIENT)
Age: 33
End: 2023-06-12

## 2023-06-13 ENCOUNTER — APPOINTMENT (OUTPATIENT)
Dept: OBGYN | Facility: CLINIC | Age: 33
End: 2023-06-13
Payer: COMMERCIAL

## 2023-06-13 VITALS
WEIGHT: 221 LBS | DIASTOLIC BLOOD PRESSURE: 75 MMHG | SYSTOLIC BLOOD PRESSURE: 113 MMHG | BODY MASS INDEX: 39.16 KG/M2 | HEIGHT: 63 IN

## 2023-06-13 DIAGNOSIS — O16.9 UNSPECIFIED MATERNAL HYPERTENSION, UNSPECIFIED TRIMESTER: ICD-10-CM

## 2023-06-13 DIAGNOSIS — Z87.59 PERSONAL HISTORY OF OTHER COMPLICATIONS OF PREGNANCY, CHILDBIRTH AND THE PUERPERIUM: ICD-10-CM

## 2023-06-13 DIAGNOSIS — R51.9 OTHER SPECIFIED PREGNANCY RELATED CONDITIONS, THIRD TRIMESTER: ICD-10-CM

## 2023-06-13 DIAGNOSIS — Z01.419 ENCOUNTER FOR GYNECOLOGICAL EXAMINATION (GENERAL) (ROUTINE) W/OUT ABNORMAL FINDINGS: ICD-10-CM

## 2023-06-13 DIAGNOSIS — N91.1 SECONDARY AMENORRHEA: ICD-10-CM

## 2023-06-13 DIAGNOSIS — O26.893 OTHER SPECIFIED PREGNANCY RELATED CONDITIONS, THIRD TRIMESTER: ICD-10-CM

## 2023-06-13 DIAGNOSIS — Z92.29 PERSONAL HISTORY OF OTHER DRUG THERAPY: ICD-10-CM

## 2023-06-13 DIAGNOSIS — L30.4 ERYTHEMA INTERTRIGO: ICD-10-CM

## 2023-06-13 DIAGNOSIS — J45.909 UNSPECIFIED ASTHMA, UNCOMPLICATED: ICD-10-CM

## 2023-06-13 DIAGNOSIS — B37.2 CANDIDIASIS OF SKIN AND NAIL: ICD-10-CM

## 2023-06-13 DIAGNOSIS — Z12.4 ENCOUNTER FOR SCREENING FOR MALIGNANT NEOPLASM OF CERVIX: ICD-10-CM

## 2023-06-13 PROCEDURE — XXXXX: CPT | Mod: 1L

## 2023-06-13 RX ORDER — MOMETASONE FUROATE 50 UG/1
AEROSOL RESPIRATORY (INHALATION)
Refills: 0 | Status: DISCONTINUED | COMMUNITY
End: 2023-06-13

## 2023-06-13 RX ORDER — PNV/FERROUS SULFATE/FOLIC ACID 27-<0.5MG
TABLET ORAL
Refills: 0 | Status: DISCONTINUED | COMMUNITY
End: 2023-06-13

## 2023-06-13 RX ORDER — NYSTATIN 100MM UNIT
POWDER (EA) MISCELLANEOUS
Qty: 1 | Refills: 2 | Status: ACTIVE | COMMUNITY
Start: 2023-06-13 | End: 1900-01-01

## 2023-06-13 RX ORDER — MOMETASONE FUROATE 220 UG/1
220 INHALANT RESPIRATORY (INHALATION)
Qty: 1 | Refills: 0 | Status: DISCONTINUED | COMMUNITY
Start: 2022-09-21 | End: 2023-06-13

## 2023-06-13 RX ORDER — DOXYLAMINE SUCCINATE AND PYRIDOXINE HYDROCHLORIDE 10; 10 MG/1; MG/1
10-10 TABLET, DELAYED RELEASE ORAL
Qty: 60 | Refills: 3 | Status: DISCONTINUED | COMMUNITY
Start: 2022-06-22 | End: 2023-06-13

## 2023-06-13 NOTE — HISTORY OF PRESENT ILLNESS
[Menarche Age: ____] : age at menarche was [unfilled] [N] : Patient does not use contraception [Y] : Patient is sexually active [Currently Active] : currently active [PapSmeardate] : 6/8/22 [TextBox_31] : neg [HIVDate] : 1/11/23 [TextBox_53] : neg [GonorrheaDate] : 8/5/19 [TextBox_63] : neg [ChlamydiaDate] : 8/5/19 [TextBox_68] : neg [HPVDate] : 6/8/22 [TextBox_78] : neg [HepatitisBDate] : 6/22/22 [TextBox_83] : neg [LMPDate] : 5/20/23 [PGHxTotal] : 2 [[facility]] : 2 [[facility]] : 2 [FreeTextEntry1] : 5/20/23

## 2023-06-13 NOTE — PHYSICAL EXAM
[Chaperone Present] : A chaperone was present in the examining room during all aspects of the physical examination [FreeTextEntry1] : BEULAH Garnica

## 2023-06-30 ENCOUNTER — TRANSCRIPTION ENCOUNTER (OUTPATIENT)
Age: 33
End: 2023-06-30

## 2023-06-30 ENCOUNTER — NON-APPOINTMENT (OUTPATIENT)
Age: 33
End: 2023-06-30

## 2023-06-30 LAB
CYTOLOGY CVX/VAG DOC THIN PREP: NORMAL
HPV HIGH+LOW RISK DNA PNL CVX: NOT DETECTED

## 2023-09-01 ENCOUNTER — NON-APPOINTMENT (OUTPATIENT)
Age: 33
End: 2023-09-01

## 2024-01-23 ENCOUNTER — NON-APPOINTMENT (OUTPATIENT)
Age: 34
End: 2024-01-23

## 2025-02-27 NOTE — HISTORY OF PRESENT ILLNESS
Subjective:      Patient ID:  Carline Chang is a 57 y.o. female who presents for   Chief Complaint   Patient presents with    Skin Check     TBSC      New Patient    Patient here today for TBSC.  C/O spot to back, right cheek. No symptoms.     Crohn's on vedalizumab  Past imuran x years    Derm HX:  Denies Phx of NMSC  Denies Fhx of MM     Current Outpatient Medications:   ·  calcitRIOL (ROCALTROL) 0.25 MCG Cap, Take 1 capsule (0.25 mcg total) by mouth 2 (two) times daily., Disp: 60 capsule, Rfl: 11  ·  ergocalciferol (ERGOCALCIFEROL) 50,000 unit Cap, TAKE 1 CAPSULE BY MOUTH ONE TIME PER WEEK, Disp: 4 capsule, Rfl: 11  ·  gabapentin (NEURONTIN) 300 MG capsule, TAKE 1 CAPSULE BY MOUTH THREE TIMES A DAY, Disp: 270 capsule, Rfl: 3  ·  HYDROcodone-acetaminophen (NORCO)  mg per tablet, Take 1 tablet by mouth 4 (four) times daily as needed for Pain., Disp: 120 tablet, Rfl: 0  ·  [START ON 3/27/2025] HYDROcodone-acetaminophen (NORCO)  mg per tablet, Take 1 tablet by mouth 4 (four) times daily as needed for Pain., Disp: 120 tablet, Rfl: 0  ·  [START ON 4/26/2025] HYDROcodone-acetaminophen (NORCO)  mg per tablet, Take 1 tablet by mouth 4 (four) times daily as needed for Pain., Disp: 120 tablet, Rfl: 0  ·  loperamide (IMODIUM) 2 mg capsule, Take 2 capsules (4 mg total) by mouth 4 (four) times daily., Disp: 90 capsule, Rfl: 5  ·  magnesium oxide 400 mg magnesium Cap, Take 400 mg by mouth Daily., Disp: , Rfl:   ·  omeprazole (PRILOSEC) 40 MG capsule, Take 40 mg by mouth every evening., Disp: , Rfl:   ·  potassium chloride SA (K-DUR,KLOR-CON) 20 MEQ tablet, Take 10 mEq by mouth 2 (two) times daily., Disp: , Rfl:   ·  promethazine (PHENERGAN) 25 MG tablet, Take 1 tablet (25 mg total) by mouth every 4 (four) hours., Disp: 30 tablet, Rfl: 1  ·  sertraline (ZOLOFT) 25 MG tablet, Take 25 mg by mouth once daily., Disp: , Rfl:   ·  spironolactone (ALDACTONE) 25 MG tablet, Take 25 mg by mouth once daily.,  Disp: , Rfl:   ·  venlafaxine (EFFEXOR) 75 MG tablet, Take 75 mg by mouth 2 (two) times daily., Disp: , Rfl:             Review of Systems   Constitutional:  Negative for fever, chills and fatigue.   Skin:  Positive for daily sunscreen use. Negative for activity-related sunscreen use (usn avoidance).       Objective:   Physical Exam   Constitutional: She appears well-developed and well-nourished. No distress.   Neurological: She is alert and oriented to person, place, and time. She is not disoriented.   Psychiatric: She has a normal mood and affect.   Skin:   Areas Examined (abnormalities noted in diagram):   Scalp / Hair Palpated and Inspected  Head / Face Inspection Performed  Neck Inspection Performed  Chest / Axilla Inspection Performed  Abdomen Inspection Performed  Genitals / Buttocks / Groin Inspection Performed  Back Inspection Performed  RUE Inspected  LUE Inspection Performed  RLE Inspected  LLE Inspection Performed  Nails and Digits Inspection Performed                             Diagram Legend     Erythematous scaling macule/papule c/w actinic keratosis       Vascular papule c/w angioma      Pigmented verrucoid papule/plaque c/w seborrheic keratosis      Yellow umbilicated papule c/w sebaceous hyperplasia      Irregularly shaped tan macule c/w lentigo     1-2 mm smooth white papules consistent with Milia      Movable subcutaneous cyst with punctum c/w epidermal inclusion cyst      Subcutaneous movable cyst c/w pilar cyst      Firm pink to brown papule c/w dermatofibroma      Pedunculated fleshy papule(s) c/w skin tag(s)      Evenly pigmented macule c/w junctional nevus     Mildly variegated pigmented, slightly irregular-bordered macule c/w mildly atypical nevus      Flesh colored to evenly pigmented papule c/w intradermal nevus       Pink pearly papule/plaque c/w basal cell carcinoma      Erythematous hyperkeratotic cursted plaque c/w SCC      Surgical scar with no sign of skin cancer recurrence       [Menarche Age: ____] : age at menarche was [unfilled] Open and closed comedones      Inflammatory papules and pustules      Verrucoid papule consistent consistent with wart     Erythematous eczematous patches and plaques     Dystrophic onycholytic nail with subungual debris c/w onychomycosis     Umbilicated papule    Erythematous-base heme-crusted tan verrucoid plaque consistent with inflamed seborrheic keratosis     Erythematous Silvery Scaling Plaque c/w Psoriasis     See annotation      Assessment / Plan:      Seborrheic keratoses  These are benign inherited growths without a malignant potential. Reassurance given to patient. No treatment is necessary.     Multiple benign nevi  Careful dermoscopy evaluation of nevi performed with none identified as needing biopsy today  Monitor for new mole or moles that are becoming bigger, darker, irritated, or developing irregular borders.     Solar lentigo  This is a benign hyperpigmented sun induced lesion. Daily sun protection will reduce the number of new lesions. Treatment of these benign lesions are considered cosmetic.    Skin cancer screening  Total body skin examination performed today including at least 12 points as noted in physical examination. No lesions suspicious for malignancy noted.    Actinic skin damage  Skin aging  Discussed sunscreen and photoprotection (daily SPF, 50+, reapplied every 2 hrs if direct prolonged sun exposure cannot be avoided)  Discussed nightly topical retinols/retinoids (OTC retinol and bakuchiol or prescription tretinoin, tazarotene or trifarotene).     - rx tretinoin 0.025% cream QHS, will increase     - reviewed side effects of tretinoin including erythema, peeling/scaling, dryness, burning, pruritus, photosensitivity, temporary worsening of acne. Reviewed that skin irritation consisting of erythema and peeling may occur during the first month of treatment. If this occurs instructed to use moisturizer and decrease tretinoin use to 3 nights per week until side effects subside and then  [PapSmeardate] : 06/08/22 [TextBox_31] : NEG increase use to daily as tolerated.     NEOVA sunscreen           No follow-ups on file.   [HIVDate] : 01/11/23 [TextBox_53] : NEG [GonorrheaDate] : 08/05/19 [TextBox_63] : NEG [ChlamydiaDate] : 08/05/19 [TextBox_68] : NEG [HPVDate] : 06/08/22 [TextBox_78] : NEG [HepatitisBDate] : 06/22/22 [TextBox_83] : NEG [LMPDate] : 05/02/22 [PGHxTotal] : 2 [[facility]] : 2 [[facility] CorporationxLiving] : 2 [FreeTextEntry1] : 05/02/22
